# Patient Record
Sex: FEMALE | Race: WHITE
[De-identification: names, ages, dates, MRNs, and addresses within clinical notes are randomized per-mention and may not be internally consistent; named-entity substitution may affect disease eponyms.]

---

## 2019-08-13 ENCOUNTER — HOSPITAL ENCOUNTER (OUTPATIENT)
Age: 59
End: 2019-08-13
Payer: COMMERCIAL

## 2019-08-13 DIAGNOSIS — Z12.4: Primary | ICD-10-CM

## 2019-09-04 ENCOUNTER — HOSPITAL ENCOUNTER (OUTPATIENT)
Age: 59
End: 2019-09-04
Payer: COMMERCIAL

## 2019-09-04 DIAGNOSIS — Z12.31: Primary | ICD-10-CM

## 2019-09-04 DIAGNOSIS — Z13.820: ICD-10-CM

## 2019-09-04 PROCEDURE — 77063 BREAST TOMOSYNTHESIS BI: CPT

## 2019-09-04 PROCEDURE — 77080 DXA BONE DENSITY AXIAL: CPT

## 2019-09-04 PROCEDURE — 77067 SCR MAMMO BI INCL CAD: CPT

## 2019-09-13 ENCOUNTER — HOSPITAL ENCOUNTER (OUTPATIENT)
Age: 59
End: 2019-09-13
Payer: COMMERCIAL

## 2019-09-13 DIAGNOSIS — Z13.21: Primary | ICD-10-CM

## 2019-09-13 LAB
CALCIUM SERPL-MCNC: 9.3 MG/DL (ref 8.5–10.1)
VITAMIN D,25 HYDROXY: 21.9 NG/ML (ref 29.95–100.01)

## 2019-09-13 PROCEDURE — 36415 COLL VENOUS BLD VENIPUNCTURE: CPT

## 2019-09-13 PROCEDURE — 82310 ASSAY OF CALCIUM: CPT

## 2019-09-13 PROCEDURE — 82306 VITAMIN D 25 HYDROXY: CPT

## 2021-02-09 ENCOUNTER — HOSPITAL ENCOUNTER (OUTPATIENT)
Age: 61
End: 2021-02-09
Payer: COMMERCIAL

## 2021-02-09 DIAGNOSIS — Z12.31: Primary | ICD-10-CM

## 2021-02-09 PROCEDURE — 77063 BREAST TOMOSYNTHESIS BI: CPT

## 2021-02-09 PROCEDURE — 77067 SCR MAMMO BI INCL CAD: CPT

## 2022-05-03 ENCOUNTER — HOSPITAL ENCOUNTER (OUTPATIENT)
Dept: HOSPITAL 100 - OPBI | Age: 62
Discharge: HOME | End: 2022-05-03
Payer: COMMERCIAL

## 2022-05-03 DIAGNOSIS — Z80.3: ICD-10-CM

## 2022-05-03 DIAGNOSIS — M85.9: ICD-10-CM

## 2022-05-03 DIAGNOSIS — Z12.31: Primary | ICD-10-CM

## 2022-05-03 PROCEDURE — 77067 SCR MAMMO BI INCL CAD: CPT

## 2022-05-03 PROCEDURE — 77063 BREAST TOMOSYNTHESIS BI: CPT

## 2022-06-02 ENCOUNTER — HOSPITAL ENCOUNTER (OUTPATIENT)
Dept: HOSPITAL 100 - OPBD | Age: 62
Discharge: HOME | End: 2022-06-02
Payer: COMMERCIAL

## 2022-06-02 DIAGNOSIS — Z78.0: ICD-10-CM

## 2022-06-02 DIAGNOSIS — M85.80: Primary | ICD-10-CM

## 2022-06-02 PROCEDURE — 77080 DXA BONE DENSITY AXIAL: CPT

## 2022-06-21 ENCOUNTER — HOSPITAL ENCOUNTER (OUTPATIENT)
Dept: HOSPITAL 100 - LABSPEC | Age: 62
Discharge: HOME | End: 2022-06-21
Payer: COMMERCIAL

## 2022-06-21 DIAGNOSIS — Z12.4: Primary | ICD-10-CM

## 2022-06-21 PROCEDURE — G0145 SCR C/V CYTO,THINLAYER,RESCR: HCPCS

## 2022-06-21 PROCEDURE — 87624 HPV HI-RISK TYP POOLED RSLT: CPT

## 2022-06-21 PROCEDURE — 88175 CYTOPATH C/V AUTO FLUID REDO: CPT

## 2023-06-27 ENCOUNTER — TELEPHONE (OUTPATIENT)
Dept: PRIMARY CARE | Facility: CLINIC | Age: 63
End: 2023-06-27

## 2023-06-27 DIAGNOSIS — M54.31 BILATERAL SCIATICA: Primary | ICD-10-CM

## 2023-06-27 DIAGNOSIS — M54.32 BILATERAL SCIATICA: Primary | ICD-10-CM

## 2023-06-27 RX ORDER — GABAPENTIN 300 MG/1
300 CAPSULE ORAL 3 TIMES DAILY
Qty: 90 CAPSULE | Refills: 5 | Status: SHIPPED | OUTPATIENT
Start: 2023-06-27 | End: 2023-12-01 | Stop reason: ALTCHOICE

## 2023-06-27 RX ORDER — PREDNISONE 20 MG/1
TABLET ORAL
Qty: 18 TABLET | Refills: 0 | Status: SHIPPED | OUTPATIENT
Start: 2023-06-27 | End: 2023-12-01 | Stop reason: ALTCHOICE

## 2023-06-27 RX ORDER — TIZANIDINE 4 MG/1
4 TABLET ORAL EVERY 6 HOURS PRN
Qty: 30 TABLET | Refills: 0 | Status: SHIPPED | OUTPATIENT
Start: 2023-06-27 | End: 2023-12-01 | Stop reason: ALTCHOICE

## 2023-06-27 NOTE — TELEPHONE ENCOUNTER
Patient called the office stating the left sciatica is acting up again and questions if you can call in the meds she took previously for it, but does not want to take the opioid.  Patient also states she is willing to try another medication if you feel it would work better for the sciatica.

## 2023-06-27 NOTE — TELEPHONE ENCOUNTER
Called patient, no answer, left message on voicemail that medications have been E-scribed to pharmacy and she may call office if has further questions.

## 2023-07-26 ENCOUNTER — HOSPITAL ENCOUNTER (OUTPATIENT)
Dept: HOSPITAL 100 - OPBI | Age: 63
Discharge: HOME | End: 2023-07-26
Payer: COMMERCIAL

## 2023-07-26 DIAGNOSIS — Z12.31: Primary | ICD-10-CM

## 2023-07-26 PROCEDURE — 77067 SCR MAMMO BI INCL CAD: CPT

## 2023-07-26 PROCEDURE — 77063 BREAST TOMOSYNTHESIS BI: CPT

## 2023-11-28 ENCOUNTER — HOSPITAL ENCOUNTER (EMERGENCY)
Facility: HOSPITAL | Age: 63
Discharge: HOME | End: 2023-11-28
Payer: COMMERCIAL

## 2023-11-28 ENCOUNTER — APPOINTMENT (OUTPATIENT)
Dept: RADIOLOGY | Facility: HOSPITAL | Age: 63
End: 2023-11-28
Payer: COMMERCIAL

## 2023-11-28 VITALS
HEIGHT: 64 IN | TEMPERATURE: 98.4 F | WEIGHT: 142 LBS | DIASTOLIC BLOOD PRESSURE: 75 MMHG | BODY MASS INDEX: 24.24 KG/M2 | HEART RATE: 83 BPM | SYSTOLIC BLOOD PRESSURE: 177 MMHG | RESPIRATION RATE: 16 BRPM | OXYGEN SATURATION: 95 %

## 2023-11-28 DIAGNOSIS — S82.839A CLOSED FRACTURE OF DISTAL END OF FIBULA, UNSPECIFIED FRACTURE MORPHOLOGY, INITIAL ENCOUNTER: Primary | ICD-10-CM

## 2023-11-28 PROCEDURE — 73610 X-RAY EXAM OF ANKLE: CPT | Mod: LT,FY,FR

## 2023-11-28 PROCEDURE — 73610 X-RAY EXAM OF ANKLE: CPT | Mod: LEFT SIDE | Performed by: RADIOLOGY

## 2023-11-28 PROCEDURE — 99283 EMERGENCY DEPT VISIT LOW MDM: CPT

## 2023-11-28 ASSESSMENT — PAIN - FUNCTIONAL ASSESSMENT: PAIN_FUNCTIONAL_ASSESSMENT: 0-10

## 2023-11-28 ASSESSMENT — COLUMBIA-SUICIDE SEVERITY RATING SCALE - C-SSRS
2. HAVE YOU ACTUALLY HAD ANY THOUGHTS OF KILLING YOURSELF?: NO
6. HAVE YOU EVER DONE ANYTHING, STARTED TO DO ANYTHING, OR PREPARED TO DO ANYTHING TO END YOUR LIFE?: NO
1. IN THE PAST MONTH, HAVE YOU WISHED YOU WERE DEAD OR WISHED YOU COULD GO TO SLEEP AND NOT WAKE UP?: NO

## 2023-11-28 ASSESSMENT — PAIN SCALES - GENERAL: PAINLEVEL_OUTOF10: 4

## 2023-11-28 ASSESSMENT — PAIN DESCRIPTION - LOCATION: LOCATION: ANKLE

## 2023-11-28 ASSESSMENT — PAIN DESCRIPTION - ORIENTATION: ORIENTATION: LEFT

## 2023-11-28 ASSESSMENT — PAIN DESCRIPTION - PAIN TYPE: TYPE: ACUTE PAIN

## 2023-11-29 NOTE — DISCHARGE INSTRUCTIONS
Crutches and post-op shoe until further directed.  Tylenol or motrin for pain.  Ice, elevate and rest

## 2023-11-29 NOTE — ED PROVIDER NOTES
"HPI   Chief Complaint   Patient presents with    Ankle Pain     Let ankle/lower leg pain \"I slipped on the ice\" around 1630 today. Denies hitting head or other injury       Patient complains of left ankle pain prior to arrival.  States she slipped on the ice.  Denies other injury no head injury or loss of consciousness.  States she had ambulate about a mile back to where she could get home.  She had pain with ambulation and movement.  Pain is relieved with rest.  She noticed small amount of swelling to the lateral left ankle.  No self treatment came right in.      History provided by:  Patient                      No data recorded                Patient History   History reviewed. No pertinent past medical history.  Past Surgical History:   Procedure Laterality Date    OTHER SURGICAL HISTORY  2020    Dilation and curettage    OTHER SURGICAL HISTORY  2020    Colonoscopy    OTHER SURGICAL HISTORY  2020     section     No family history on file.  Social History     Tobacco Use    Smoking status: Former     Types: Cigarettes    Smokeless tobacco: Never   Substance Use Topics    Alcohol use: Yes     Comment: occasionally    Drug use: Never       Physical Exam   ED Triage Vitals [23 1816]   Temp Heart Rate Resp BP   36.9 °C (98.4 °F) 83 16 177/75      SpO2 Temp Source Heart Rate Source Patient Position   95 % Temporal -- --      BP Location FiO2 (%)     -- --       Physical Exam  Vitals and nursing note reviewed.   Constitutional:       General: She is not in acute distress.     Appearance: Normal appearance. She is well-developed, well-groomed and normal weight. She is not ill-appearing or toxic-appearing.   HENT:      Head: Normocephalic.      Right Ear: External ear normal.      Left Ear: External ear normal.      Nose: Nose normal.      Mouth/Throat:      Mouth: Mucous membranes are moist.   Eyes:      General: No scleral icterus.     Conjunctiva/sclera: Conjunctivae normal. "   Musculoskeletal:         General: Swelling and tenderness present. No deformity.      Left ankle: Swelling present. No deformity. Tenderness present over the lateral malleolus. No base of 5th metatarsal or proximal fibula tenderness. Decreased range of motion. Normal pulse.      Left Achilles Tendon: Normal.      Left foot: Normal.   Skin:     General: Skin is warm.      Capillary Refill: Capillary refill takes less than 2 seconds.      Findings: No bruising or rash.   Neurological:      General: No focal deficit present.      Mental Status: She is alert and oriented to person, place, and time.      GCS: GCS eye subscore is 4. GCS verbal subscore is 5. GCS motor subscore is 6.      Cranial Nerves: No cranial nerve deficit or facial asymmetry.      Sensory: No sensory deficit.      Motor: No weakness.   Psychiatric:         Mood and Affect: Mood normal.         Behavior: Behavior normal. Behavior is cooperative.         Thought Content: Thought content normal.         Judgment: Judgment normal.         ED Course & MDM   Diagnoses as of 11/28/23 1936   Closed fracture of distal end of fibula, unspecified fracture morphology, initial encounter       Medical Decision Making  Patient complains of left ankle pain prior to arrival.  States she slipped on the ice.  Denies other injury no head injury or loss of consciousness.  States she had ambulate about a mile back to where she could get home.  She had pain with ambulation and movement.  Pain is relieved with rest.  She noticed small amount of swelling to the lateral left ankle.  No self treatment came right in.    Ddx: Fracture, contusion, strain, sprain, other    Series obtained read by myself showing a lateral malleoli are fracture.  Questionable posterior malleoli are fracture.  Patient placed in walking boot and crutches.  Instructed to follow-up with either foot and ankle specialist or general orthopedics.  Both options given to patient.  Patient is discharged  home in improved and stable condition.  9 pain medication while in the emergency department.    Amount and/or Complexity of Data Reviewed  Radiology: ordered and independent interpretation performed. Decision-making details documented in ED Course.     Details: By myself showing lateral malleoli are fracture.  Possible posterior malleoli are fracture.    Risk  OTC drugs.  Diagnosis or treatment significantly limited by social determinants of health.        Procedure  Procedures     Veena Luis PA-C  11/28/23 1936

## 2023-12-01 ENCOUNTER — OFFICE VISIT (OUTPATIENT)
Dept: ORTHOPEDIC SURGERY | Facility: CLINIC | Age: 63
End: 2023-12-01
Payer: COMMERCIAL

## 2023-12-01 DIAGNOSIS — S82.892A CLOSED LEFT ANKLE FRACTURE, INITIAL ENCOUNTER: Primary | ICD-10-CM

## 2023-12-01 PROCEDURE — 1036F TOBACCO NON-USER: CPT | Performed by: NURSE PRACTITIONER

## 2023-12-01 PROCEDURE — 99204 OFFICE O/P NEW MOD 45 MIN: CPT | Performed by: NURSE PRACTITIONER

## 2023-12-01 RX ORDER — ACETAMINOPHEN 500 MG
TABLET ORAL DAILY
COMMUNITY

## 2023-12-01 ASSESSMENT — ENCOUNTER SYMPTOMS
NEUROLOGICAL NEGATIVE: 1
RESPIRATORY NEGATIVE: 1
CARDIOVASCULAR NEGATIVE: 1
ENDOCRINE NEGATIVE: 1
PSYCHIATRIC NEGATIVE: 1
CONSTITUTIONAL NEGATIVE: 1
HEMATOLOGIC/LYMPHATIC NEGATIVE: 1
MUSCULOSKELETAL NEGATIVE: 1

## 2023-12-01 ASSESSMENT — PAIN SCALES - GENERAL: PAINLEVEL_OUTOF10: 5 - MODERATE PAIN

## 2023-12-01 ASSESSMENT — PAIN DESCRIPTION - DESCRIPTORS: DESCRIPTORS: ACHING;DULL

## 2023-12-01 ASSESSMENT — PAIN - FUNCTIONAL ASSESSMENT: PAIN_FUNCTIONAL_ASSESSMENT: 0-10

## 2023-12-01 NOTE — PROGRESS NOTES
Subjective    Patient ID: Natalie Fung is a 63 y.o. female.    Chief Complaint: Pain of the Left Ankle (Patient had a fall on 11/28/2023 and she went to the ER they did Xray's at that time. )       TANYA  Is a pleasant 63-year-old female presenting today for evaluation of left ankle injury and fracture. Slid on ice, twisted outward and landed. + swelling and pain after, diffiiculty walking. Took tylenol once yesterday with some relief.   No ice or elevation. Boot helps with immobilization. Minimal wt bearing using crutches.     Review of Systems   Constitutional: Negative.    HENT: Negative.     Respiratory: Negative.     Cardiovascular: Negative.    Endocrine: Negative.    Musculoskeletal: Negative.    Skin: Negative.    Neurological: Negative.    Hematological: Negative.    Psychiatric/Behavioral: Negative.      Foot/Ankle Musculoskeletal Exam  Gait    Assistive device: crutches  Gait additional comments: Minimal touch toe weight, in tall walking boot    Inspection    Left      Edema: moderate        Ecchymosis: medium        Deformity: none        Alignment: normal      Palpation    Left       Tenderness: present          ATFL: moderate          Distal fibula: mild      Range of Motion    Left      Active Dorsiflexion: 10      Active Plantar Flexion: 15      Active Inversion: 5      Active Eversion: 5    Neurovascular    Left      Pulses - DP: normal      Dorsalis pedis: 2+      Capillary refill: brisk and well-perfused    Special Tests    Left      Anterior drawer test: negative      Membreno's test: negative      Special tests additional comments: No pain over metatarsals, full ROM of distal joints with no sx aggravation, distal motor and sensory intact    Objective   Left Ankle Exam     Tests   Anterior drawer: negative          Image Results:  XR ankle left 3+ views  Narrative: STUDY:  Ankle Radiographs;    11/28/2023 5:26 PM.  INDICATION:  Pain.  COMPARISON:  None Available.  ACCESSION  NUMBER(S):  [id number]  ORDERING CLINICIAN:  DAVIDE MENA  TECHNIQUE:  Three view(s) of the left ankle.  FINDINGS:    There is an oblique but essentially nondisplaced fracture in the  distal fibula just above the lateral malleolus.  There also appears to  be a nondisplaced fracture in the posterior tibial articular surface..   The medial malleolus is intact and there is no disruption of the  ankle mortise..  No soft tissue abnormality is seen.  Impression: There is an oblique but nondisplaced fracture in the distal fibula  above the lateral malleolus and a nondisplaced vertical fracture  through the posterior tibia at the articular surface with the talus..  Signed by Tony Leiva MD    Reviewed imaging with patient during today's visit of both fractures.  Collaborated case with Dr. Stephenson prior to visit on date of service.    Assessment/Plan   Encounter Diagnoses:  Problem List Items Addressed This Visit             ICD-10-CM    Closed left ankle fracture, initial encounter - Primary S82.892A     Sx control with OTC NSAIDs per package directions, take with food, Tylenol prn.   Instructed on elevation and ice.  Patient was instructed to wear the boot at all times with exception to remove daily and as needed for hygiene and/or icing.  Patient was advised to continue with nonweightbearing status and anticipate 4 to 6 weeks of this.  A prescription for a knee walker was provided.  Activity restriction recommended:  Follow up here 2-3 weeks with repeat imaging, sooner for changes or concerns.    We reviewed calcium and vitamin D supplementation daily and at least 2 servings of dairy daily for bone health.  At time of DC, patient states she is not going to a podiatry appointment as ordered by the ED for the same issue.  Patient is aware that either myself or podiatry can manage this case.  I did speak with Dr. Terrazas who was anticipating the same plan of care.  At this time, patient will continue to follow here.  She  is aware I can easily refer should there be any concerns in bony alignment for evaluation.  She is in agreement with this plan of care.  This note was generated using Dragon software.  It may contain errors in wording, punctuation or spelling.           Relevant Orders    Follow Up In Orthopaedic Surgery    Knee Walker    XR ankle left 3+ views

## 2023-12-01 NOTE — ASSESSMENT & PLAN NOTE
Sx control with OTC NSAIDs per package directions, take with food, Tylenol prn.   Instructed on elevation and ice.  Patient was instructed to wear the boot at all times with exception to remove daily and as needed for hygiene and/or icing.  Patient was advised to continue with nonweightbearing status and anticipate 4 to 6 weeks of this.  A prescription for a knee walker was provided.  Activity restriction recommended:  Follow up here 2-3 weeks with repeat imaging, sooner for changes or concerns.    We reviewed calcium and vitamin D supplementation daily and at least 2 servings of dairy daily for bone health.  At time of DC, patient states she is not going to a podiatry appointment as ordered by the ED for the same issue.  Patient is aware that either myself or podiatry can manage this case.  I did speak with Dr. Terrazas who was anticipating the same plan of care.  At this time, patient will continue to follow here.  She is aware I can easily refer should there be any concerns in bony alignment for evaluation.  She is in agreement with this plan of care.  This note was generated using Dragon software.  It may contain errors in wording, punctuation or spelling.

## 2023-12-14 PROBLEM — M54.16 LUMBAR RADICULOPATHY, ACUTE: Status: ACTIVE | Noted: 2023-12-14

## 2023-12-15 ENCOUNTER — ANCILLARY PROCEDURE (OUTPATIENT)
Dept: RADIOLOGY | Facility: CLINIC | Age: 63
End: 2023-12-15
Payer: COMMERCIAL

## 2023-12-15 ENCOUNTER — OFFICE VISIT (OUTPATIENT)
Dept: ORTHOPEDIC SURGERY | Facility: CLINIC | Age: 63
End: 2023-12-15
Payer: COMMERCIAL

## 2023-12-15 DIAGNOSIS — S82.892A CLOSED LEFT ANKLE FRACTURE, INITIAL ENCOUNTER: Primary | ICD-10-CM

## 2023-12-15 DIAGNOSIS — S82.892A CLOSED LEFT ANKLE FRACTURE, INITIAL ENCOUNTER: ICD-10-CM

## 2023-12-15 PROCEDURE — 73610 X-RAY EXAM OF ANKLE: CPT | Mod: LEFT SIDE | Performed by: RADIOLOGY

## 2023-12-15 PROCEDURE — 73610 X-RAY EXAM OF ANKLE: CPT | Mod: LT,FY

## 2023-12-15 PROCEDURE — 1036F TOBACCO NON-USER: CPT | Performed by: NURSE PRACTITIONER

## 2023-12-15 PROCEDURE — 99213 OFFICE O/P EST LOW 20 MIN: CPT | Performed by: NURSE PRACTITIONER

## 2023-12-15 ASSESSMENT — PAIN SCALES - GENERAL: PAINLEVEL_OUTOF10: 2

## 2023-12-15 ASSESSMENT — ENCOUNTER SYMPTOMS
RESPIRATORY NEGATIVE: 1
NEUROLOGICAL NEGATIVE: 1
CONSTITUTIONAL NEGATIVE: 1
ARTHRALGIAS: 1
ENDOCRINE NEGATIVE: 1
CARDIOVASCULAR NEGATIVE: 1
PSYCHIATRIC NEGATIVE: 1
HEMATOLOGIC/LYMPHATIC NEGATIVE: 1

## 2023-12-15 ASSESSMENT — PAIN - FUNCTIONAL ASSESSMENT: PAIN_FUNCTIONAL_ASSESSMENT: 0-10

## 2023-12-15 ASSESSMENT — PAIN DESCRIPTION - DESCRIPTORS: DESCRIPTORS: DULL

## 2023-12-15 NOTE — ASSESSMENT & PLAN NOTE
Sx control with OTC NSAIDs per package directions, take with food, Tylenol prn, elevation and ice.  Patient was instructed to wear the boot at all times with exception to remove daily and as needed for hygiene and/or icing. Patient was advised to continue with nonweightbearing status and anticipate 2 to 4 weeks of this.  Continue to use crutches or knee walker for mobility.  Follow up here 2-3 weeks with repeat imaging, sooner for changes or concerns.    This note was generated using Dragon software.  It may contain errors in wording, punctuation or spelling.

## 2023-12-15 NOTE — PROGRESS NOTES
Subjective    Patient ID: Natalie Fung is a 63 y.o. female.    Chief Complaint: Fracture and Follow-up of the Left Ankle       Left Ankle     Natalie is a pleasant 63-year-old female presenting today for FUV left ankle injury and fracture. Slid on ice, twisted outward and landed. + swelling and pain after, diffiiculty walking.   Rare use of Tylenol or ibuprofen with good symptom control  Occasional ice. Boot helps with immobilization. Minimal touch toe wt bearing using crutches or knee walker.    Review of Systems   Constitutional: Negative.    HENT: Negative.     Respiratory: Negative.     Cardiovascular: Negative.    Endocrine: Negative.    Musculoskeletal:  Positive for arthralgias.   Skin: Negative.    Neurological: Negative.    Hematological: Negative.    Psychiatric/Behavioral: Negative.        Foot/Ankle Musculoskeletal Exam  Gait    Assistive device: crutches  Gait additional comments: Mostly non weight bearing, in walking boot    Inspection    Left      Edema: mild        Ecchymosis: medium        Deformity: none        Alignment: normal      Palpation    Left       Tenderness: present        Tenderness comment: distal posterior tibia        ATFL: mild          Distal fibula: mild      Range of Motion    Left      Active Dorsiflexion: 10      Active Plantar Flexion: 15      Active Inversion: 5      Active Eversion: 5    Neurovascular    Left      Pulses - DP: normal      Dorsalis pedis: 2+      Capillary refill: brisk and well-perfused    Special Tests    Left      Anterior drawer test: negative      Membreno's test: negative      Special tests additional comments: No pain over metatarsals, full ROM of distal joints with no sx aggravation, distal motor and sensory intact    Objective   Left Ankle Exam     Tests   Anterior drawer: negative          Image Results:  I independent review of ankle images compared to prior imaging during today's visit.  There does appear to be some sclerosis and callus formation  of the distal fibula, alignment unchanged.  The posterior tibia fracture site also appears the same with some sclerosis and callus formation, no change in alignment.  Ankle mortise is intact.  Await radiology report.      Assessment/Plan   Encounter Diagnoses:  Problem List Items Addressed This Visit             ICD-10-CM    Closed left ankle fracture, initial encounter - Primary S82.892A    Relevant Orders    Follow Up In Orthopaedic Surgery    XR ankle left 3+ views     Problem List Items Addressed This Visit             ICD-10-CM    Closed left ankle fracture, initial encounter - Primary S82.892A     Sx control with OTC NSAIDs per package directions, take with food, Tylenol prn, elevation and ice.  Patient was instructed to wear the boot at all times with exception to remove daily and as needed for hygiene and/or icing. Patient was advised to continue with nonweightbearing status and anticipate 2 to 4 weeks of this.  Continue to use crutches or knee walker for mobility.  Follow up here 2-3 weeks with repeat imaging, sooner for changes or concerns.    This note was generated using Dragon software.  It may contain errors in wording, punctuation or spelling.            Relevant Orders    Follow Up In Orthopaedic Surgery    XR ankle left 3+ views

## 2024-01-02 ENCOUNTER — ANCILLARY PROCEDURE (OUTPATIENT)
Dept: RADIOLOGY | Facility: CLINIC | Age: 64
End: 2024-01-02
Payer: COMMERCIAL

## 2024-01-02 ENCOUNTER — OFFICE VISIT (OUTPATIENT)
Dept: ORTHOPEDIC SURGERY | Facility: CLINIC | Age: 64
End: 2024-01-02
Payer: COMMERCIAL

## 2024-01-02 DIAGNOSIS — S82.892A CLOSED LEFT ANKLE FRACTURE, INITIAL ENCOUNTER: Primary | ICD-10-CM

## 2024-01-02 DIAGNOSIS — S82.892A CLOSED LEFT ANKLE FRACTURE, INITIAL ENCOUNTER: ICD-10-CM

## 2024-01-02 PROCEDURE — 99213 OFFICE O/P EST LOW 20 MIN: CPT | Performed by: NURSE PRACTITIONER

## 2024-01-02 PROCEDURE — 73610 X-RAY EXAM OF ANKLE: CPT | Mod: LT

## 2024-01-02 PROCEDURE — 73610 X-RAY EXAM OF ANKLE: CPT | Mod: LEFT SIDE | Performed by: RADIOLOGY

## 2024-01-02 PROCEDURE — L1902 AFO ANKLE GAUNTLET PRE OTS: HCPCS | Performed by: NURSE PRACTITIONER

## 2024-01-02 PROCEDURE — 1036F TOBACCO NON-USER: CPT | Performed by: NURSE PRACTITIONER

## 2024-01-02 ASSESSMENT — ENCOUNTER SYMPTOMS
CARDIOVASCULAR NEGATIVE: 1
RESPIRATORY NEGATIVE: 1
PSYCHIATRIC NEGATIVE: 1
NEUROLOGICAL NEGATIVE: 1
HEMATOLOGIC/LYMPHATIC NEGATIVE: 1
ENDOCRINE NEGATIVE: 1
CONSTITUTIONAL NEGATIVE: 1
ARTHRALGIAS: 1

## 2024-01-02 ASSESSMENT — PAIN SCALES - GENERAL: PAINLEVEL_OUTOF10: 1

## 2024-01-02 ASSESSMENT — PAIN DESCRIPTION - DESCRIPTORS: DESCRIPTORS: DULL

## 2024-01-02 ASSESSMENT — PAIN - FUNCTIONAL ASSESSMENT: PAIN_FUNCTIONAL_ASSESSMENT: 0-10

## 2024-01-02 NOTE — PROGRESS NOTES
Subjective    Patient ID: Natalie Fung is a 63 y.o. female.    Chief Complaint: Pain of the Left Ankle (LEFT ANKLE FUV/CLOSED FRACTURE 12-15-23/X-RAYS 1-2-24/PAIN RATE 1)       Left Ankle     Natalie is a pleasant 63-year-old female presenting today for FUV left ankle injury and fracture from 12/15/23. Slid on ice, twisted outward and landed. + swelling and pain after, diffiiculty walking.   Rare use of Tylenol or ibuprofen with good symptom control.  Occasional ice. Boot helps with immobilization. Minimal touch toe wt bearing using crutches or knee walker.    Review of Systems   Constitutional: Negative.    HENT: Negative.     Respiratory: Negative.     Cardiovascular: Negative.    Endocrine: Negative.    Musculoskeletal:  Positive for arthralgias.   Skin: Negative.    Neurological: Negative.    Hematological: Negative.    Psychiatric/Behavioral: Negative.        Foot/Ankle Musculoskeletal Exam  Gait    Assistive device: crutches  Gait additional comments: Mostly non weight bearing, in walking boot    Inspection    Left      Edema: mild        Ecchymosis: small        Deformity: none        Alignment: normal      Palpation    Left       Tenderness: present        Tenderness comment: distal posterior tibia        ATFL: mild          Distal fibula: mild      Range of Motion    Left      Active Dorsiflexion: 10      Active Plantar Flexion: 20      Active Inversion: 10      Active Eversion: 10    Neurovascular    Left      Pulses - DP: normal      Dorsalis pedis: 2+      Capillary refill: brisk and well-perfused    Special Tests    Left      Anterior drawer test: negative      Membreno's test: negative      Special tests additional comments: No pain over metatarsals, full ROM of distal joints with no sx aggravation, distal motor and sensory intact    Objective   Left Ankle Exam     Tests   Anterior drawer: negative          Image Results:  Independent review of ankle images compared to prior imaging during today's  visit.  There is increased callus formation of the distal fibula, alignment unchanged.  The posterior tibia fracture site with good bridging callus formation, no change in alignment.  Ankle mortise is intact.  Await radiology report.    === 01/02/24 ===    XR ANKLE 3+ VIEWS LEFT    - Impression -  1. Healing nondisplaced Faulkner B distal fibular fracture with  surrounding reactive soft tissue swelling.    MACRO:  None.    Signed by: Sabra Harris 1/3/2024 5:55 PM  Dictation workstation:   [id number]     Assessment/Plan   Encounter Diagnoses:  Problem List Items Addressed This Visit             ICD-10-CM    Closed left ankle fracture, initial encounter - Primary S82.892A     Sx control with OTC NSAIDs per package directions, take with food, Tylenol prn, elevation and ice.  Patient was instructed to wear the boot at all times with activity, may remove daily and as needed for hygiene and/or icing. OK to remove with rest and sleep as tolerated. May attempt ankle speed brace for support with rest.  Continue to use crutches or knee walker for mobility.  Of gradual increase of weightbearing activity.  Patient was recommended she may put approximately 25% weightbearing with crutch use x 1 week, progressed to 50% weightbearing with crutch use, 75% x 1 week.  Patient will be referred for formal PT for range of motion and early strengthening.  Plan will be to follow-up here in approximately 2 to 3 weeks with repeat imaging to verify clearance for full weightbearing status.  Patient in agreement with plan of care.  This note was generated using Dragon software.  It may contain errors in wording, punctuation or spelling.         Relevant Orders    Follow Up In Orthopaedic Surgery    XR ankle left 3+ views    Referral to Physical Therapy

## 2024-01-03 ENCOUNTER — APPOINTMENT (OUTPATIENT)
Dept: ORTHOPEDIC SURGERY | Facility: CLINIC | Age: 64
End: 2024-01-03
Payer: COMMERCIAL

## 2024-01-10 NOTE — ASSESSMENT & PLAN NOTE
Sx control with OTC NSAIDs per package directions, take with food, Tylenol prn, elevation and ice.  Patient was instructed to wear the boot at all times with activity, may remove daily and as needed for hygiene and/or icing. OK to remove with rest and sleep as tolerated. May attempt ankle speed brace for support with rest.  Continue to use crutches or knee walker for mobility.  Of gradual increase of weightbearing activity.  Patient was recommended she may put approximately 25% weightbearing with crutch use x 1 week, progressed to 50% weightbearing with crutch use, 75% x 1 week.  Patient will be referred for formal PT for range of motion and early strengthening.  Plan will be to follow-up here in approximately 2 to 3 weeks with repeat imaging to verify clearance for full weightbearing status.  Patient in agreement with plan of care.  This note was generated using Dragon software.  It may contain errors in wording, punctuation or spelling.

## 2024-01-15 ENCOUNTER — OFFICE VISIT (OUTPATIENT)
Dept: ORTHOPEDIC SURGERY | Facility: CLINIC | Age: 64
End: 2024-01-15
Payer: COMMERCIAL

## 2024-01-15 ENCOUNTER — ANCILLARY PROCEDURE (OUTPATIENT)
Dept: RADIOLOGY | Facility: CLINIC | Age: 64
End: 2024-01-15
Payer: COMMERCIAL

## 2024-01-15 DIAGNOSIS — S82.892A CLOSED LEFT ANKLE FRACTURE, INITIAL ENCOUNTER: ICD-10-CM

## 2024-01-15 PROCEDURE — E0100 CANE ADJUST/FIXED WITH TIP: HCPCS | Performed by: NURSE PRACTITIONER

## 2024-01-15 PROCEDURE — 73610 X-RAY EXAM OF ANKLE: CPT | Mod: LEFT SIDE | Performed by: RADIOLOGY

## 2024-01-15 PROCEDURE — 1036F TOBACCO NON-USER: CPT | Performed by: NURSE PRACTITIONER

## 2024-01-15 PROCEDURE — 99213 OFFICE O/P EST LOW 20 MIN: CPT | Performed by: NURSE PRACTITIONER

## 2024-01-15 PROCEDURE — 73610 X-RAY EXAM OF ANKLE: CPT | Mod: LT

## 2024-01-15 RX ORDER — DICLOFENAC SODIUM 10 MG/G
4 GEL TOPICAL 4 TIMES DAILY PRN
Qty: 100 G | Refills: 1 | Status: SHIPPED | OUTPATIENT
Start: 2024-01-15

## 2024-01-15 ASSESSMENT — ENCOUNTER SYMPTOMS
CONSTITUTIONAL NEGATIVE: 1
ARTHRALGIAS: 1
PSYCHIATRIC NEGATIVE: 1
HEMATOLOGIC/LYMPHATIC NEGATIVE: 1
CARDIOVASCULAR NEGATIVE: 1
NEUROLOGICAL NEGATIVE: 1
ENDOCRINE NEGATIVE: 1
RESPIRATORY NEGATIVE: 1

## 2024-01-15 ASSESSMENT — PAIN - FUNCTIONAL ASSESSMENT: PAIN_FUNCTIONAL_ASSESSMENT: NO/DENIES PAIN

## 2024-01-15 NOTE — PROGRESS NOTES
Subjective    Patient ID: Natalie Fung is a 63 y.o. female.    Chief Complaint: Fracture of the Left Ankle (Patient his here for FUV for fracture of left ankle that occurred on 11/28/2023)     Left Ankle       Natalie is a pleasant 63-year-old female presenting today for FUV left ankle injury and fracture from 12/15/23. Slid on ice, twisted outward and landed. + swelling and pain after, diffiiculty walking. Occasional ice. Boot helps with immobilization.  Progressed to weightbearing to 75% to 100% in the boot.  She does use crutches with work, light activity at home as tolerated well.  No pain or increased swelling noted.  No pain, crutches minimal  No sx aggravation with walking in boot, no meds for pain cotrol needed  HEP 1-2 times daily    Review of Systems   Constitutional: Negative.    HENT: Negative.     Respiratory: Negative.     Cardiovascular: Negative.    Endocrine: Negative.    Musculoskeletal:  Positive for arthralgias.   Skin: Negative.    Neurological: Negative.    Hematological: Negative.    Psychiatric/Behavioral: Negative.        Foot/Ankle Musculoskeletal Exam  Gait    Limp: left    Assistive device: crutches  Gait additional comments:  mostly weightbearing and walking boot, testing at length positive light crutch use    Inspection    Left      Edema: mild        Edema comment: Improved      Ecchymosis: none        Deformity: none        Alignment: normal        Previous ankle incision: no previous incision    Palpation    Left       Tenderness: none        Tenderness comment: none    Range of Motion    Left      Active Dorsiflexion: 10      Active Plantar Flexion: 40      Active Inversion: 20      Active Eversion: 20    Neurovascular    Left      Pulses - DP: normal      Dorsalis pedis: 2+      Capillary refill: brisk and well-perfused    Special Tests    Left      Anterior drawer test: negative      Talar tilt stress test: negative        Membreno's test: negative      Special tests additional  comments: Full ROM of distal joints with no sx aggravation, distal motor and sensory intact    Objective   Left Ankle Exam     Tests   Anterior drawer: negative        Image Results:  Independent review of ankle images compared to prior imaging during today's visit.  There is increased callus formation of the distal fibula, alignment unchanged.  The posterior tibia fracture site with good callus formation, no change in alignment.  Ankle mortise is intact.  Await radiology report.    === 01/02/24 ===    XR ANKLE 3+ VIEWS LEFT    - Impression -  1. Healing nondisplaced Faulkner B distal fibular fracture with  surrounding reactive soft tissue swelling.    MACRO:  None.    Signed by: Sabra Harris 1/3/2024 5:55 PM  Dictation workstation:   [id number]     Assessment/Plan   Encounter Diagnoses:  Problem List Items Addressed This Visit             ICD-10-CM    Closed left ankle fracture, initial encounter S82.892A    Relevant Orders    XR ankle left 3+ views     Problem List Items Addressed This Visit             ICD-10-CM    Closed left ankle fracture, initial encounter S82.892A     Sx control with OTC NSAIDs per package directions, take with food, Tylenol prn, elevation and ice.  Patient was instructed to wear the boot with heavier activity. OK to remove with rest and sleep as tolerated. May attempt ankle speed brace for support with light activity and transition into FT wear over 1 week as tolerated.  May discontinue use of crutches or knee walker at this time.   Keep therapy appointment as scheduled, okay to advance to range of motion and weightbearing as tolerated, PT may also attempt to wean out of the brace as patient tolerates.  Plan will be to follow-up here in approximately 4 weeks with repeat imaging.  Patient in agreement with plan of care.  This note was generated using Dragon software.  It may contain errors in wording, punctuation or spelling.         Relevant Medications    diclofenac sodium (Voltaren) 1 %  gel gel    Other Relevant Orders    XR ankle left 3+ views    Follow Up In Orthopaedic Surgery    XR ankle left 3+ views

## 2024-01-15 NOTE — ASSESSMENT & PLAN NOTE
Sx control with OTC NSAIDs per package directions, take with food, Tylenol prn, elevation and ice.  Patient was instructed to wear the boot with heavier activity. OK to remove with rest and sleep as tolerated. May attempt ankle speed brace for support with light activity and transition into FT wear over 1 week as tolerated.  May discontinue use of crutches or knee walker at this time.   Keep therapy appointment as scheduled, okay to advance to range of motion and weightbearing as tolerated, PT may also attempt to wean out of the brace as patient tolerates.  Plan will be to follow-up here in approximately 4 weeks with repeat imaging.  Patient in agreement with plan of care.  This note was generated using Dragon software.  It may contain errors in wording, punctuation or spelling.

## 2024-01-25 ENCOUNTER — EVALUATION (OUTPATIENT)
Dept: PHYSICAL THERAPY | Facility: CLINIC | Age: 64
End: 2024-01-25
Payer: COMMERCIAL

## 2024-01-25 DIAGNOSIS — S82.892D CLOSED FRACTURE OF LEFT ANKLE, WITH ROUTINE HEALING, SUBSEQUENT ENCOUNTER: ICD-10-CM

## 2024-01-25 DIAGNOSIS — S82.892D: Primary | ICD-10-CM

## 2024-01-25 PROCEDURE — 97161 PT EVAL LOW COMPLEX 20 MIN: CPT | Mod: GP | Performed by: PHYSICAL THERAPIST

## 2024-01-25 PROCEDURE — 97110 THERAPEUTIC EXERCISES: CPT | Mod: GP | Performed by: PHYSICAL THERAPIST

## 2024-01-25 ASSESSMENT — PATIENT HEALTH QUESTIONNAIRE - PHQ9
SUM OF ALL RESPONSES TO PHQ9 QUESTIONS 1 AND 2: 0
2. FEELING DOWN, DEPRESSED OR HOPELESS: NOT AT ALL
1. LITTLE INTEREST OR PLEASURE IN DOING THINGS: NOT AT ALL

## 2024-01-25 ASSESSMENT — PAIN SCALES - GENERAL: PAINLEVEL_OUTOF10: 1

## 2024-01-25 ASSESSMENT — PAIN - FUNCTIONAL ASSESSMENT: PAIN_FUNCTIONAL_ASSESSMENT: 0-10

## 2024-01-25 ASSESSMENT — ENCOUNTER SYMPTOMS
DEPRESSION: 0
LOSS OF SENSATION IN FEET: 0
OCCASIONAL FEELINGS OF UNSTEADINESS: 0

## 2024-01-25 NOTE — PROGRESS NOTES
Physical Therapy Evaluation and Treatment      Patient Name: Natalie Fung  MRN: [id number]  Today's Date: 1/25/2024  Time Calculation  Start Time: 1555  Stop Time: 1630  Time Calculation (min): 35 min      Visit # 1/7    Assessment:  PT Assessment Results: Decreased strength, Decreased range of motion, Impaired balance, Decreased mobility, Pain  Rehab Prognosis: Good  Barriers to Participation:  (None)    The pt presents with Medical Dx of L ankle fx.   Pt presents with the following deficits: increased pain, decreased strength, ROM, flexibility, balance, gait and functional mobility.  Pt would benefit from PT services in order to improve on these deficits and maximize strength and ability for functional activity/mobility.        Plan:  Treatment/Interventions: Cryotherapy, Education/ Instruction, Hot pack, Gait training, Manual therapy, Neuromuscular re-education, Therapeutic exercises  PT Plan: Skilled PT  PT Frequency:  (1x/wk for 6 weeks or 7 sessions)  Rehab Potential: Good  Plan of Care Agreement: Patient (Patient Goal:  improve strength, ROM and get back to normal activity)    Current Problem:   Problem List Items Addressed This Visit             ICD-10-CM       Other    Left malleolar fracture, closed, with routine healing, subsequent encounter - Primary S82.892D    Relevant Orders    Follow Up In Physical Therapy       Subjective   The pt slipped on black ice in 11/2023 sustaining L ankle fx.  Pt was in a cam walker boot for 7 weeks and is now able to WBAT on the L leg.  Pt reports that the ankle pain is worse with standing or walking too long and improves with ice or rest.      General  Reason for Referral: L ankle injury  Referred By: Daya Dixon CNP  General Comment: Visit # 1/7    Precautions:  Precautions  Precautions Comment: Lower Fall Risk.   PMH:  No significant PMH    Pain:  Pain Assessment  Pain Assessment: 0-10  Pain Score: 1 (L ankle pain range  0-5/10)  Pain Location: Ankle  Pain  Orientation: Left    Home Living:   Lives alone.  No concerns with home set up.      Prior Level of Function:   Pt was I with all ADL's and IADL's prior.  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         Objective   Observation:  Mild-Moderate edema L ankle/lower leg.    Gait:  Gait is mildly antalgic with use of cane in ankle brace WBAT.    Palpation:  Mild tenderness lateral ankle.      Sensation:  Intact to light touch.      Strength:             R-   WNL Throughout        L-   DF   4-/5    PF   4-/5   Inv   4-/5   Ev   4-/5    Ankle PROM:              R-   WNL Throughout        L-   DF 9  deg   Inv 30  deg   Ev 18  deg    Flexibility:   Mild Gastroc tightness.    Special Tests:       N/A    Outcome Measures:  Other Measures  Lower Extremity Funtional Score (LEFS): 49     Treatments:  Ther Ex:  10 min  1 unit  DF Strap Stretch  20 sec hold x 3  Resistive DR/Inv/Ev   x 20 reps ea   Blue Band    HEP instruction with handout given.      OP EDUCATION:  PT edu pt regarding PT POC/course of treatment and HEP.  Pt was given exercise handout as a reference.  Pt verbalized good understanding.  Access Code: [email or web address]      Goals:  Active       PT Problem       PT Goal 1       Start:  24    Expected End:  24       LTG's:  1) Improve L ankle strength from 4-/5  to >= 5-/5 throughout in order to facilitate safe gait and mobility.      4-6 weeks      2) Improve L ankle PROM/AROM from  9 deg DF to >= 13 deg DF  in order to facilitate safe gait and stair negotiation.      4-6 weeks      3) Improve L ankle pain from  5/10 to <= 0-2/10 with activity in order to improve QOL.  4-6 weeks      4) Improve LEFS score by >= 5 points in order to improve QOL.   4-6 weeks      5) Pt will be able to walk longer distances, negotiate stairs, and return to exercising, or work around the home on on the job without significant limitation.      4-6 weeks      ST) Pt/caregiver will be I and consistent with HEP with handout as needed in  order to maximize ankle strength and ROM/flexibility.    2-3 weeks

## 2024-02-01 ENCOUNTER — TREATMENT (OUTPATIENT)
Dept: PHYSICAL THERAPY | Facility: CLINIC | Age: 64
End: 2024-02-01
Payer: COMMERCIAL

## 2024-02-01 DIAGNOSIS — S82.892D: ICD-10-CM

## 2024-02-01 DIAGNOSIS — S82.892D CLOSED FRACTURE OF LEFT ANKLE, WITH ROUTINE HEALING, SUBSEQUENT ENCOUNTER: ICD-10-CM

## 2024-02-01 PROCEDURE — 97110 THERAPEUTIC EXERCISES: CPT | Mod: GP,CQ

## 2024-02-01 ASSESSMENT — PAIN - FUNCTIONAL ASSESSMENT: PAIN_FUNCTIONAL_ASSESSMENT: 0-10

## 2024-02-01 ASSESSMENT — PAIN SCALES - GENERAL: PAINLEVEL_OUTOF10: 4

## 2024-02-01 NOTE — PROGRESS NOTES
"Physical Therapy Treatment    Patient Name: Natalie Fung  MRN: [id number]  Today's Date: 2/1/2024  Time Calculation  Start Time: 1445  Stop Time: 1528  Time Calculation (min): 43 min      Assessment:   Patient able to tolerate session with mild difficulty. Patient tolerates standing PRE's with no exacerbation of symptoms. Challenged with standing heel raises and lunges, but is able to complete reps.    Plan:  Continue to work on improving strength and ROM to be able to ascend/descend stairs with reciprocal pattern.     OP PT Plan  Treatment/Interventions: Cryotherapy, Education/ Instruction, Hot pack, Gait training, Manual therapy, Neuromuscular re-education, Therapeutic exercises  PT Plan: Skilled PT  PT Frequency:  (1x/wk for 6 weeks or 7 sessions)  Rehab Potential: Good  Plan of Care Agreement: Patient (Patient Goal:  improve strength, ROM and get back to normal activity)    Current Problem  Problem List Items Addressed This Visit             ICD-10-CM    Left malleolar fracture, closed, with routine healing, subsequent encounter S82.892D     Other Visit Diagnoses         Codes    Closed fracture of left ankle, with routine healing, subsequent encounter     S82.892D            Subjective   General  General Comment: Visit # 2/7  Patient states that when she's walking is when she's having pain. States that other than that she's not having pain. States that sleeping is better.     Precautions  Precautions  Precautions Comment: Lower Fall Risk.   PMH:  No significant PMH    Pain  Pain Assessment: 0-10  Pain Score: 4  Pain Location: Ankle  Pain Orientation: Left      Treatments:  Therapeutic Exercise: 40 minutes, 3 units  Recumbent bike x5' lvl 5 (N)  Slantboard 2x1' (N)  Step ups 6\" step 2x10 Fwd/Lateral L leading (N)  BAPs board 2x10 Flex/Ext/Side to side/CW/CCW L   DF Strap Stretch  30 sec hold x 3  Resistive DR/Inv/Ev   x 20 reps ea   BOSU band (N)  Standing heel raises 2x10 Bilat (N)  SLS 3x30\" L (N)  Fwd lunges " 2x10 L leading (N)    OP EDUCATION:   Access Code: [email or web address]     Goals:  Active       PT Problem       PT Goal 1       Start:  24    Expected End:  24       LTG's:  1) Improve L ankle strength from 4-/5  to >= 5-/5 throughout in order to facilitate safe gait and mobility.      4-6 weeks      2) Improve L ankle PROM/AROM from  9 deg DF to >= 13 deg DF  in order to facilitate safe gait and stair negotiation.      4-6 weeks      3) Improve L ankle pain from  5/10 to <= 0-2/10 with activity in order to improve QOL.  4-6 weeks      4) Improve LEFS score by >= 5 points in order to improve QOL.   4-6 weeks      5) Pt will be able to walk longer distances, negotiate stairs, and return to exercising, or work around the home on on the job without significant limitation.      4-6 weeks      ST) Pt/caregiver will be I and consistent with HEP with handout as needed in order to maximize ankle strength and ROM/flexibility.    2-3 weeks

## 2024-02-06 ENCOUNTER — DOCUMENTATION (OUTPATIENT)
Dept: PHYSICAL THERAPY | Facility: CLINIC | Age: 64
End: 2024-02-06
Payer: COMMERCIAL

## 2024-02-06 NOTE — PROGRESS NOTES
Physical Therapy                 Therapy Communication Note    Patient Name: Natalie Fung  MRN: [id number]  Today's Date: 2/6/2024     Discipline: Physical Therapy    Missed Time: No Show    Comment:

## 2024-02-19 ENCOUNTER — APPOINTMENT (OUTPATIENT)
Dept: ORTHOPEDIC SURGERY | Facility: CLINIC | Age: 64
End: 2024-02-19
Payer: COMMERCIAL

## 2024-02-22 ENCOUNTER — OFFICE VISIT (OUTPATIENT)
Dept: ORTHOPEDIC SURGERY | Facility: CLINIC | Age: 64
End: 2024-02-22
Payer: COMMERCIAL

## 2024-02-22 ENCOUNTER — TREATMENT (OUTPATIENT)
Dept: PHYSICAL THERAPY | Facility: CLINIC | Age: 64
End: 2024-02-22
Payer: COMMERCIAL

## 2024-02-22 ENCOUNTER — HOSPITAL ENCOUNTER (OUTPATIENT)
Dept: RADIOLOGY | Facility: CLINIC | Age: 64
Discharge: HOME | End: 2024-02-22
Payer: COMMERCIAL

## 2024-02-22 VITALS — WEIGHT: 151 LBS | BODY MASS INDEX: 25.78 KG/M2 | HEIGHT: 64 IN

## 2024-02-22 DIAGNOSIS — S82.892D: ICD-10-CM

## 2024-02-22 DIAGNOSIS — S82.892D CLOSED FRACTURE OF LEFT ANKLE, WITH ROUTINE HEALING, SUBSEQUENT ENCOUNTER: ICD-10-CM

## 2024-02-22 DIAGNOSIS — S82.892A CLOSED LEFT ANKLE FRACTURE, INITIAL ENCOUNTER: Primary | ICD-10-CM

## 2024-02-22 DIAGNOSIS — S82.892A CLOSED LEFT ANKLE FRACTURE, INITIAL ENCOUNTER: ICD-10-CM

## 2024-02-22 PROCEDURE — 73610 X-RAY EXAM OF ANKLE: CPT | Mod: LEFT SIDE | Performed by: STUDENT IN AN ORGANIZED HEALTH CARE EDUCATION/TRAINING PROGRAM

## 2024-02-22 PROCEDURE — 99213 OFFICE O/P EST LOW 20 MIN: CPT | Performed by: NURSE PRACTITIONER

## 2024-02-22 PROCEDURE — 1036F TOBACCO NON-USER: CPT | Performed by: NURSE PRACTITIONER

## 2024-02-22 PROCEDURE — 73610 X-RAY EXAM OF ANKLE: CPT | Mod: LT

## 2024-02-22 PROCEDURE — 97110 THERAPEUTIC EXERCISES: CPT | Mod: GP | Performed by: PHYSICAL THERAPIST

## 2024-02-22 ASSESSMENT — ENCOUNTER SYMPTOMS
ARTHRALGIAS: 1
ENDOCRINE NEGATIVE: 1
CARDIOVASCULAR NEGATIVE: 1
HEMATOLOGIC/LYMPHATIC NEGATIVE: 1
PSYCHIATRIC NEGATIVE: 1
RESPIRATORY NEGATIVE: 1
NEUROLOGICAL NEGATIVE: 1
CONSTITUTIONAL NEGATIVE: 1

## 2024-02-22 ASSESSMENT — PAIN SCALES - GENERAL
PAINLEVEL_OUTOF10: 3
PAINLEVEL_OUTOF10: 3

## 2024-02-22 ASSESSMENT — PAIN DESCRIPTION - DESCRIPTORS: DESCRIPTORS: DULL;ACHING

## 2024-02-22 ASSESSMENT — PAIN - FUNCTIONAL ASSESSMENT
PAIN_FUNCTIONAL_ASSESSMENT: 0-10
PAIN_FUNCTIONAL_ASSESSMENT: 0-10

## 2024-02-22 NOTE — PROGRESS NOTES
"Physical Therapy Treatment    Patient Name: Natalie Fung  MRN: [id number]  Today's Date: 2/22/2024  Time Calculation  Start Time: 1545  Stop Time: 1626  Time Calculation (min): 41 min      PT Therapeutic Procedures Time Entry  Therapeutic Exercise Time Entry: 40                         General  General Comment: Visit # 3/7    Assessment:   Pt had good overall tolerance to exercises performed in treatment today.  Pt was challenged with ex's performed.  L ankle strength and ROM/flexibility is improving significantly from baseline.          Plan:   OP PT Plan  Treatment/Interventions: Cryotherapy, Education/ Instruction, Hot pack, Gait training, Manual therapy, Neuromuscular re-education, Therapeutic exercises  PT Plan: Skilled PT  PT Frequency:  (1x/wk for 6 weeks or 7 sessions)  Rehab Potential: Good  Plan of Care Agreement: Patient (Patient Goal:  improve strength, ROM and get back to normal activity)    Continue to work on improving strength and ROM to be able to ascend/descend stairs with reciprocal pattern.        Current Problem  Problem List Items Addressed This Visit             ICD-10-CM       Other    Left malleolar fracture, closed, with routine healing, subsequent encounter S82.892D     Other Visit Diagnoses         Codes    Closed fracture of left ankle, with routine healing, subsequent encounter     S82.892D            Subjective  Pt reports that her L ankle is sore at times with activity but is improving overall.  Pt recently saw MD who told pt everything is healing well and looking good on x-ray.      Precautions  Precautions  Precautions Comment: Lower Fall Risk.   PMH:  No significant PMH    Pain  Pain Assessment: 0-10  Pain Score: 3  Pain Location: Ankle  Pain Orientation: Left  Patient's Stated Pain Goal: No pain      Treatments:  Therapeutic Exercise: 40 minutes, 3 units  Recumbent bike x5' lvl 5   Slantboard 2x1'   Step ups 6\" step 2x10 Fwd/Lateral L leading   Standing heel raises on edge of " "step 3-way (in-out-straight)  x 10 reps ea  P  SLS  on airex   3x30\" L   BOSU Lunges  B/L  2 x 10 reps ea  alternating   N  Sports Cord  1 cord  Fwd/Back  x 5 reps ea  N  DF Strap Stretch  30 sec hold x 3  Resistive DF/Inv/Ev   x 20 reps ea   BOSU band     OP EDUCATION:    Access Code: [email or web address]   Edu on proper form and speed of movement with ex's.  Pt verbalized/demonstrated good understanding.         Goals:  Active       PT Problem       PT Goal 1       Start:  24    Expected End:  24       LTG's:  1) Improve L ankle strength from 4-/5  to >= 5-/5 throughout in order to facilitate safe gait and mobility.      4-6 weeks      2) Improve L ankle PROM/AROM from  9 deg DF to >= 13 deg DF  in order to facilitate safe gait and stair negotiation.      4-6 weeks      3) Improve L ankle pain from  5/10 to <= 0-2/10 with activity in order to improve QOL.  4-6 weeks      4) Improve LEFS score by >= 5 points in order to improve QOL.   4-6 weeks      5) Pt will be able to walk longer distances, negotiate stairs, and return to exercising, or work around the home on on the job without significant limitation.      4-6 weeks      ST) Pt/caregiver will be I and consistent with HEP with handout as needed in order to maximize ankle strength and ROM/flexibility.    2-3 weeks              "

## 2024-02-22 NOTE — PROGRESS NOTES
Subjective    Patient ID: Natalie Fung is a 63 y.o. female.    Chief Complaint: Pain of the Left Ankle (FX- XRAYS DONE )     Left Ankle     Natalie is a pleasant 63-year-old female presenting today for FUV left ankle injury and fracture from 12/15/23. Slid on ice, twisted outward and landed. + swelling and pain after, diffiiculty walking. Occasional ice. Boot helps with immobilization.  Progressed to weightbearing to 75% to 100% in the boot.  She does use crutches with work, light activity at home as tolerated well.  No pain or increased swelling noted.  No pain, crutches minimal  No sx aggravation with walking in boot, no meds for pain cotrol needed  HEP 1-2 times daily    Review of Systems   Constitutional: Negative.    HENT: Negative.     Respiratory: Negative.     Cardiovascular: Negative.    Endocrine: Negative.    Musculoskeletal:  Positive for arthralgias.   Skin: Negative.    Neurological: Negative.    Hematological: Negative.    Psychiatric/Behavioral: Negative.        Foot/Ankle Musculoskeletal Exam  Gait    Limp: left    Assistive device: crutches  Gait additional comments:  mostly weightbearing and walking boot, testing at length positive light crutch use    Inspection    Left      Edema: mild        Edema comment: Improved      Ecchymosis: none        Deformity: none        Alignment: normal        Previous ankle incision: no previous incision    Palpation    Left       Tenderness: none        Tenderness comment: none    Range of Motion    Left      Active Dorsiflexion: 10      Active Plantar Flexion: 40      Active Inversion: 20      Active Eversion: 20    Neurovascular    Left      Pulses - DP: normal      Dorsalis pedis: 2+      Capillary refill: brisk and well-perfused    Special Tests    Left      Anterior drawer test: negative      Talar tilt stress test: negative        Membreno's test: negative      Special tests additional comments: Full ROM of distal joints with no sx aggravation, distal  motor and sensory intact    Objective   Left Ankle Exam     Tests   Anterior drawer: negative        Image Results:  Independent review of ankle images compared to prior imaging during today's visit.  There is increased callus formation of the distal fibula, alignment unchanged.  The posterior tibia fracture site with good callus formation, no change in alignment.  Ankle mortise is intact.  Await radiology report.    === 01/02/24 ===    XR ANKLE 3+ VIEWS LEFT    - Impression -  1. Healing nondisplaced Faulkner B distal fibular fracture with  surrounding reactive soft tissue swelling.    MACRO:  None.    Signed by: Sabra Harris 1/3/2024 5:55 PM  Dictation workstation:   [id number]     Assessment/Plan   Encounter Diagnoses:

## 2024-02-29 ENCOUNTER — TREATMENT (OUTPATIENT)
Dept: PHYSICAL THERAPY | Facility: CLINIC | Age: 64
End: 2024-02-29
Payer: COMMERCIAL

## 2024-02-29 DIAGNOSIS — S82.892D CLOSED FRACTURE OF LEFT ANKLE, WITH ROUTINE HEALING, SUBSEQUENT ENCOUNTER: ICD-10-CM

## 2024-02-29 DIAGNOSIS — S82.892D: ICD-10-CM

## 2024-02-29 PROCEDURE — 97110 THERAPEUTIC EXERCISES: CPT | Mod: GP | Performed by: PHYSICAL THERAPIST

## 2024-02-29 ASSESSMENT — PAIN SCALES - GENERAL: PAINLEVEL_OUTOF10: 1

## 2024-02-29 ASSESSMENT — PAIN - FUNCTIONAL ASSESSMENT: PAIN_FUNCTIONAL_ASSESSMENT: 0-10

## 2024-02-29 NOTE — PROGRESS NOTES
"Physical Therapy Treatment    Patient Name: Natalie Fung  MRN: [id number]  Today's Date: 2/29/2024  Time Calculation  Start Time: 1545  Stop Time: 1626  Time Calculation (min): 41 min      PT Therapeutic Procedures Time Entry  Therapeutic Exercise Time Entry: 40                         General  General Comment: Visit # 4/7    Assessment:   Good form with all ex's with minimal cueing needed.  L ankle strength, ROM and proprioception is improving.      Plan:   OP PT Plan  Treatment/Interventions: Cryotherapy, Education/ Instruction, Hot pack, Gait training, Manual therapy, Neuromuscular re-education, Therapeutic exercises  PT Plan: Skilled PT  PT Frequency:  (1x/wk for 6 weeks or 7 sessions)  Rehab Potential: Good  Plan of Care Agreement: Patient (Patient Goal:  improve strength, ROM and get back to normal activity)     Continue to work on improving strength and ROM to be able to ascend/descend stairs with reciprocal pattern.     Current Problem  Problem List Items Addressed This Visit             ICD-10-CM       Other    Left malleolar fracture, closed, with routine healing, subsequent encounter S82.892D     Other Visit Diagnoses         Codes    Closed fracture of left ankle, with routine healing, subsequent encounter     S82.892D            Subjective  Pt reports that her L ankle is doing good with minimal if any pain.  Pt reports she has had some R foot numbness/pins/needles the last couple days that she has not had yet.      Precautions  Precautions  Precautions Comment: Lower Fall Risk.   PMH:  No significant PMH    Pain  Pain Assessment: 0-10  Pain Score: 1  Pain Type: Chronic pain  Pain Location: Ankle  Pain Orientation: Left  Patient's Stated Pain Goal: No pain      Treatments:  Therapeutic Exercise: 40 minutes, 3 units  Recumbent bike x5' lvl 5   Slantboard 2x1'   Step ups 6\" step 2x10 Fwd/Lateral L leading   Standing heel to toe raises on edge of step 3-way (in-out-straight)  x 10 reps ea    SLS  on airex  " " 3x30\" L   BOSU Lunges  B/L  2 x 10 reps ea  alternating     Shuttle Leg Press  B/L / U/L  50#/25#  2 x 10 reps  N  Sports Cord  1 cord  Fwd/Back  x 5 reps ea    DF Strap Stretch  30 sec hold x 3  Resistive DF/Inv/Ev   x 20 reps ea   BOSU band        OP EDUCATION:   Access Code: [email or web address]    Edu on proper form and speed of movement with ex's.  Pt verbalized/demonstrated good understanding.      Goals:  Active       PT Problem       PT Goal 1       Start:  24    Expected End:  24       LTG's:  1) Improve L ankle strength from 4-/5  to >= 5-/5 throughout in order to facilitate safe gait and mobility.      4-6 weeks      2) Improve L ankle PROM/AROM from  9 deg DF to >= 13 deg DF  in order to facilitate safe gait and stair negotiation.      4-6 weeks      3) Improve L ankle pain from  5/10 to <= 0-2/10 with activity in order to improve QOL.  4-6 weeks      4) Improve LEFS score by >= 5 points in order to improve QOL.   4-6 weeks      5) Pt will be able to walk longer distances, negotiate stairs, and return to exercising, or work around the home on on the job without significant limitation.      4-6 weeks      ST) Pt/caregiver will be I and consistent with HEP with handout as needed in order to maximize ankle strength and ROM/flexibility.    2-3 weeks              "

## 2024-03-07 ENCOUNTER — TREATMENT (OUTPATIENT)
Dept: PHYSICAL THERAPY | Facility: CLINIC | Age: 64
End: 2024-03-07
Payer: COMMERCIAL

## 2024-03-07 DIAGNOSIS — S82.892D CLOSED FRACTURE OF LEFT ANKLE, WITH ROUTINE HEALING, SUBSEQUENT ENCOUNTER: ICD-10-CM

## 2024-03-07 DIAGNOSIS — S82.892D: ICD-10-CM

## 2024-03-07 PROCEDURE — 97110 THERAPEUTIC EXERCISES: CPT | Mod: GP | Performed by: PHYSICAL THERAPIST

## 2024-03-07 ASSESSMENT — PAIN - FUNCTIONAL ASSESSMENT: PAIN_FUNCTIONAL_ASSESSMENT: 0-10

## 2024-03-07 ASSESSMENT — PAIN SCALES - GENERAL: PAINLEVEL_OUTOF10: 3

## 2024-03-07 NOTE — PROGRESS NOTES
"Physical Therapy Treatment    Patient Name: Natalie Fung  MRN: [id number]  Today's Date: 3/7/2024  Time Calculation  Start Time: 1545  Stop Time: 1626  Time Calculation (min): 41 min      PT Therapeutic Procedures Time Entry  Therapeutic Exercise Time Entry: 40                         General  General Comment: Visit # 5/7    Assessment:   Pt had good overall tolerance to exercises performed in treatment today.  Pt was challenged with ex's performed.  L ankle strength and balance is improving with PT rx.         Plan:   OP PT Plan  Treatment/Interventions: Cryotherapy, Education/ Instruction, Hot pack, Gait training, Manual therapy, Neuromuscular re-education, Therapeutic exercises  PT Plan: Skilled PT  PT Frequency:  (1x/wk for 6 weeks or 7 sessions)  Rehab Potential: Good  Plan of Care Agreement: Patient (Patient Goal:  improve strength, ROM and get back to normal activity)     Continue to work on improving strength and ROM to be able to ascend/descend stairs with reciprocal pattern.     Current Problem  Problem List Items Addressed This Visit             ICD-10-CM       Other    Left malleolar fracture, closed, with routine healing, subsequent encounter S82.892D    Closed fracture of left ankle, with routine healing, subsequent encounter S82.892D       Subjective  Pt reports that her L ankle is a little sore today from being up and walking a lot today.      Precautions  Precautions  Precautions Comment: Lower Fall Risk.   PMH:  No significant PMH    Pain  Pain Assessment: 0-10  Pain Score: 3  Pain Type: Chronic pain  Pain Location: Ankle  Pain Orientation: Left  Patient's Stated Pain Goal: No pain      Treatments:  Therapeutic Exercise: 40 minutes, 3 units  Recumbent bike x5' lvl 5   Slantboard 2x1'   Step ups 12\" step 2x10 Fwd/Lateral L leading P  Standing heel  raises on edge of step 3-way (in-out-straight)  x 10 reps ea    SLS  on turf with Basketball toss   3x20\" L P  Balance Board on Turf with UE support on " shelving   3 x 20 sec hold  N  BOSU Lunges  B/L  2 x 10 reps ea  alternating     Shuttle Leg Press  B/L / U/L  50#/25#  2 x 10 reps    Sports Cord  1 cord  Fwd/Back/sideways  x 5 reps ea    DF Strap Stretch  30 sec hold x 3  Resistive DF/Inv/Ev   x 20 reps ea   BOSU band        OP EDUCATION:   Access Code: [email or web address]    Edu on proper form and speed of movement with ex's.  Pt verbalized/demonstrated good understanding.    Goals:  Active       PT Problem       PT Goal 1       Start:  24    Expected End:  24       LTG's:  1) Improve L ankle strength from 4-/5  to >= 5-/5 throughout in order to facilitate safe gait and mobility.      4-6 weeks      2) Improve L ankle PROM/AROM from  9 deg DF to >= 13 deg DF  in order to facilitate safe gait and stair negotiation.      4-6 weeks      3) Improve L ankle pain from  5/10 to <= 0-2/10 with activity in order to improve QOL.  4-6 weeks      4) Improve LEFS score by >= 5 points in order to improve QOL.   4-6 weeks      5) Pt will be able to walk longer distances, negotiate stairs, and return to exercising, or work around the home on on the job without significant limitation.      4-6 weeks      ST) Pt/caregiver will be I and consistent with HEP with handout as needed in order to maximize ankle strength and ROM/flexibility.    2-3 weeks

## 2024-03-14 ENCOUNTER — APPOINTMENT (OUTPATIENT)
Dept: PHYSICAL THERAPY | Facility: CLINIC | Age: 64
End: 2024-03-14
Payer: COMMERCIAL

## 2024-03-18 ENCOUNTER — TREATMENT (OUTPATIENT)
Dept: PHYSICAL THERAPY | Facility: CLINIC | Age: 64
End: 2024-03-18
Payer: COMMERCIAL

## 2024-03-18 ENCOUNTER — APPOINTMENT (OUTPATIENT)
Dept: PHYSICAL THERAPY | Facility: CLINIC | Age: 64
End: 2024-03-18
Payer: COMMERCIAL

## 2024-03-18 ENCOUNTER — OFFICE VISIT (OUTPATIENT)
Dept: ORTHOPEDIC SURGERY | Facility: CLINIC | Age: 64
End: 2024-03-18
Payer: COMMERCIAL

## 2024-03-18 DIAGNOSIS — S82.892D CLOSED FRACTURE OF LEFT ANKLE, WITH ROUTINE HEALING, SUBSEQUENT ENCOUNTER: ICD-10-CM

## 2024-03-18 DIAGNOSIS — S82.892A CLOSED LEFT ANKLE FRACTURE, INITIAL ENCOUNTER: Primary | ICD-10-CM

## 2024-03-18 DIAGNOSIS — S82.892D: ICD-10-CM

## 2024-03-18 DIAGNOSIS — S82.892D CLOSED FRACTURE OF LEFT ANKLE, WITH ROUTINE HEALING, SUBSEQUENT ENCOUNTER: Primary | ICD-10-CM

## 2024-03-18 DIAGNOSIS — Z87.81 HISTORY OF ANKLE FRACTURE: ICD-10-CM

## 2024-03-18 PROCEDURE — 99212 OFFICE O/P EST SF 10 MIN: CPT | Performed by: NURSE PRACTITIONER

## 2024-03-18 PROCEDURE — 1036F TOBACCO NON-USER: CPT | Performed by: NURSE PRACTITIONER

## 2024-03-18 PROCEDURE — 97110 THERAPEUTIC EXERCISES: CPT | Mod: GP | Performed by: PHYSICAL THERAPIST

## 2024-03-18 ASSESSMENT — ENCOUNTER SYMPTOMS
RESPIRATORY NEGATIVE: 1
HEMATOLOGIC/LYMPHATIC NEGATIVE: 1
CONSTITUTIONAL NEGATIVE: 1
ENDOCRINE NEGATIVE: 1
PSYCHIATRIC NEGATIVE: 1
CARDIOVASCULAR NEGATIVE: 1
NEUROLOGICAL NEGATIVE: 1
ARTHRALGIAS: 1

## 2024-03-18 ASSESSMENT — PAIN - FUNCTIONAL ASSESSMENT
PAIN_FUNCTIONAL_ASSESSMENT: 0-10
PAIN_FUNCTIONAL_ASSESSMENT: 0-10

## 2024-03-18 ASSESSMENT — PAIN SCALES - GENERAL
PAINLEVEL_OUTOF10: 1
PAINLEVEL_OUTOF10: 1

## 2024-03-18 NOTE — ASSESSMENT & PLAN NOTE
Sx control with OTC NSAIDs per package directions, take with food, Tylenol prn, elevation and ice.  Continue with PT and recommended home exercises for strengthening.  Activities as tolerated with gradual progression.  Discussed use of good footwear for support and OTC shoe inserts as needed.  Plan will be to follow-up here on as needed basis for any concerns, changes or new symptoms..  Patient in agreement with plan of care.  This note was generated using Dragon software.  It may contain errors in wording, punctuation or spelling.
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
## 2024-03-18 NOTE — PROGRESS NOTES
Physical Therapy Discharge/Treatment    Patient Name: Natalie Fung  MRN: [id number]  Today's Date: 3/18/2024  Time Calculation  Start Time: 1000  Stop Time: 1027  Time Calculation (min): 27 min      PT Therapeutic Procedures Time Entry  Therapeutic Exercise Time Entry: 25                         General  General Comment: Visit # 6    Assessment:   The pt has made good objective progress in PT with improved L ankle strength, balance and ROM/flexibility.  The pt has MET her PT goals and is I with current HEP.  DC PT at this time.  Follow up with MD as needed.      Plan:   OP PT Plan  Treatment/Interventions: Cryotherapy, Education/ Instruction, Hot pack, Gait training, Manual therapy, Neuromuscular re-education, Therapeutic exercises  PT Plan: Skilled PT  PT Frequency:  No Further Visits.   DC.    Rehab Potential: Good  Plan of Care Agreement: Patient (Patient Goal:  improve strength, ROM and get back to normal activity)    The pt has made good objective progress in PT with improved L ankle strength, balance and ROM/flexibility.  The pt has MET her PT goals and is I with current HEP.  DC PT at this time.  Follow up with MD as needed.        Current Problem  Problem List Items Addressed This Visit             ICD-10-CM       Other    Left malleolar fracture, closed, with routine healing, subsequent encounter S82.892D    Closed fracture of left ankle, with routine healing, subsequent encounter - Primary S82.892D       Subjective  Pt reports that her L ankle is doing well and that she saw her MD today who released her.  Pt reports that she feels good about DC today and will continue with her ex's as able moving forward.      Precautions  Precautions  Precautions Comment: Lower Fall Risk.   PMH:  No significant PMH    Pain  Pain Assessment: 0-10  Pain Score: 1  Pain Type: Chronic pain  Pain Location: Ankle  Pain Orientation: Left  Patient's Stated Pain Goal: No pain      Treatments:  Therapeutic Exercise: 25 minutes, 2  "units  Recumbent bike x5' lvl 5   Slantboard 2x1'   Step ups 12\" step 2x10 Fwd/Lateral L leading   Standing heel  raises on edge of step 3-way (in-out-straight)  x 10 reps ea    SLS  on airex   3x20\" L   Balance Board on Turf with UE support on shelving   3 x 20 sec hold    3/18/2024 PT DC Summary Completed Today.    Following Ex's X today:    BOSU Lunges  B/L  2 x 10 reps ea  alternating     Shuttle Leg Press  B/L / U/L  50#/25#  2 x 10 reps    Sports Cord  1 cord  Fwd/Back/sideways  x 5 reps ea    DF Strap Stretch  30 sec hold x 3  Resistive DF/Inv/Ev   x 20 reps ea   BOSU band     OP EDUCATION:   Access Code: [email or web address]    Edu on proper form and speed of movement with ex's.  Pt verbalized/demonstrated good understanding.    Goals:  Active       PT Problem       PT Goal 1       Start:  24    Expected End:  24       LTG's:  1) Improve L ankle strength from 4-/5  to >= 5-/5 throughout in order to facilitate safe gait and mobility.      4-6 weeks  3/18/2024, MET, L ankle strength 5-/5 throughout at DC.     2) Improve L ankle PROM/AROM from  9 deg DF to >= 13 deg DF  in order to facilitate safe gait and stair negotiation.      4-6 weeks  3/18/2024, MET, L ankle PROM/AROM > 13 deg DF at DC.     3) Improve L ankle pain from  5/10 to <= 0-2/10 with activity in order to improve QOL.  4-6 weeks  3/18/2024, MET, L ankle pain range  0-2/10 with activity.      4) Improve LEFS score by >= 5 points in order to improve QOL.   4-6 weeks  3/18/2024, MET, pt scored  49 at baseline and a 68 at DC.     5) Pt will be able to walk longer distances, negotiate stairs, and return to exercising, or work around the home on on the job without significant limitation.      4-6 weeks  3/18/2024, MET, pt is doing a lot better overall with all above activity.      ST) Pt/caregiver will be I and consistent with HEP with handout as needed in order to maximize ankle strength and ROM/flexibility.    2-3 weeks  3/18/2024, MET, pt is I " with HEP and has handouts as a reference.

## 2024-03-22 ENCOUNTER — APPOINTMENT (OUTPATIENT)
Dept: ORTHOPEDIC SURGERY | Facility: CLINIC | Age: 64
End: 2024-03-22
Payer: COMMERCIAL

## 2024-09-21 ENCOUNTER — HOSPITAL ENCOUNTER (OUTPATIENT)
Dept: CARDIOLOGY | Facility: HOSPITAL | Age: 64
Discharge: HOME | End: 2024-09-21
Payer: COMMERCIAL

## 2024-09-21 ENCOUNTER — APPOINTMENT (OUTPATIENT)
Dept: RADIOLOGY | Facility: HOSPITAL | Age: 64
End: 2024-09-21
Payer: COMMERCIAL

## 2024-09-21 ENCOUNTER — HOSPITAL ENCOUNTER (EMERGENCY)
Facility: HOSPITAL | Age: 64
Discharge: HOME | End: 2024-09-21
Attending: EMERGENCY MEDICINE
Payer: COMMERCIAL

## 2024-09-21 VITALS
HEIGHT: 64 IN | OXYGEN SATURATION: 98 % | SYSTOLIC BLOOD PRESSURE: 142 MMHG | HEART RATE: 60 BPM | TEMPERATURE: 97.6 F | BODY MASS INDEX: 23.9 KG/M2 | WEIGHT: 140 LBS | DIASTOLIC BLOOD PRESSURE: 74 MMHG | RESPIRATION RATE: 16 BRPM

## 2024-09-21 DIAGNOSIS — S62.101A RIGHT WRIST FRACTURE, CLOSED, INITIAL ENCOUNTER: Primary | ICD-10-CM

## 2024-09-21 DIAGNOSIS — D32.9 MENINGIOMA (MULTI): ICD-10-CM

## 2024-09-21 PROCEDURE — 2500000004 HC RX 250 GENERAL PHARMACY W/ HCPCS (ALT 636 FOR OP/ED): Performed by: EMERGENCY MEDICINE

## 2024-09-21 PROCEDURE — 71045 X-RAY EXAM CHEST 1 VIEW: CPT

## 2024-09-21 PROCEDURE — 93005 ELECTROCARDIOGRAM TRACING: CPT

## 2024-09-21 PROCEDURE — 72125 CT NECK SPINE W/O DYE: CPT

## 2024-09-21 PROCEDURE — 73110 X-RAY EXAM OF WRIST: CPT | Mod: RT

## 2024-09-21 PROCEDURE — 73110 X-RAY EXAM OF WRIST: CPT | Mod: RIGHT SIDE | Performed by: RADIOLOGY

## 2024-09-21 PROCEDURE — 70450 CT HEAD/BRAIN W/O DYE: CPT | Performed by: STUDENT IN AN ORGANIZED HEALTH CARE EDUCATION/TRAINING PROGRAM

## 2024-09-21 PROCEDURE — 96375 TX/PRO/DX INJ NEW DRUG ADDON: CPT

## 2024-09-21 PROCEDURE — 71045 X-RAY EXAM CHEST 1 VIEW: CPT | Mod: FOREIGN READ | Performed by: RADIOLOGY

## 2024-09-21 PROCEDURE — 72125 CT NECK SPINE W/O DYE: CPT | Performed by: STUDENT IN AN ORGANIZED HEALTH CARE EDUCATION/TRAINING PROGRAM

## 2024-09-21 PROCEDURE — 99285 EMERGENCY DEPT VISIT HI MDM: CPT | Mod: 25

## 2024-09-21 PROCEDURE — 96374 THER/PROPH/DIAG INJ IV PUSH: CPT

## 2024-09-21 PROCEDURE — 73130 X-RAY EXAM OF HAND: CPT | Mod: RIGHT SIDE | Performed by: RADIOLOGY

## 2024-09-21 PROCEDURE — 29125 APPL SHORT ARM SPLINT STATIC: CPT | Mod: RT

## 2024-09-21 PROCEDURE — 73130 X-RAY EXAM OF HAND: CPT | Mod: RT

## 2024-09-21 PROCEDURE — 70450 CT HEAD/BRAIN W/O DYE: CPT

## 2024-09-21 RX ORDER — ONDANSETRON HYDROCHLORIDE 2 MG/ML
4 INJECTION, SOLUTION INTRAVENOUS ONCE
Status: COMPLETED | OUTPATIENT
Start: 2024-09-21 | End: 2024-09-21

## 2024-09-21 RX ORDER — MORPHINE SULFATE 2 MG/ML
2 INJECTION, SOLUTION INTRAMUSCULAR; INTRAVENOUS ONCE
Status: COMPLETED | OUTPATIENT
Start: 2024-09-21 | End: 2024-09-21

## 2024-09-21 RX ORDER — TRAMADOL HYDROCHLORIDE 50 MG/1
50 TABLET ORAL EVERY 6 HOURS PRN
Qty: 12 TABLET | Refills: 0 | Status: SHIPPED | OUTPATIENT
Start: 2024-09-21 | End: 2024-09-24

## 2024-09-21 RX ADMIN — ONDANSETRON 4 MG: 2 INJECTION INTRAMUSCULAR; INTRAVENOUS at 13:05

## 2024-09-21 RX ADMIN — MORPHINE SULFATE 2 MG: 2 INJECTION, SOLUTION INTRAMUSCULAR; INTRAVENOUS at 13:08

## 2024-09-21 ASSESSMENT — PAIN DESCRIPTION - ORIENTATION: ORIENTATION: RIGHT

## 2024-09-21 ASSESSMENT — PAIN SCALES - GENERAL
PAINLEVEL_OUTOF10: 6
PAINLEVEL_OUTOF10: 5 - MODERATE PAIN

## 2024-09-21 ASSESSMENT — PAIN - FUNCTIONAL ASSESSMENT: PAIN_FUNCTIONAL_ASSESSMENT: 0-10

## 2024-09-21 ASSESSMENT — PAIN DESCRIPTION - LOCATION: LOCATION: WRIST

## 2024-09-21 ASSESSMENT — COLUMBIA-SUICIDE SEVERITY RATING SCALE - C-SSRS
1. IN THE PAST MONTH, HAVE YOU WISHED YOU WERE DEAD OR WISHED YOU COULD GO TO SLEEP AND NOT WAKE UP?: NO
6. HAVE YOU EVER DONE ANYTHING, STARTED TO DO ANYTHING, OR PREPARED TO DO ANYTHING TO END YOUR LIFE?: NO
2. HAVE YOU ACTUALLY HAD ANY THOUGHTS OF KILLING YOURSELF?: NO
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
## 2024-09-24 ENCOUNTER — HOSPITAL ENCOUNTER (OUTPATIENT)
Dept: RADIOLOGY | Facility: EXTERNAL LOCATION | Age: 64
Discharge: HOME | End: 2024-09-24

## 2024-09-24 ENCOUNTER — OFFICE VISIT (OUTPATIENT)
Dept: ORTHOPEDIC SURGERY | Facility: CLINIC | Age: 64
End: 2024-09-24
Payer: COMMERCIAL

## 2024-09-24 DIAGNOSIS — S62.101A RIGHT WRIST FRACTURE, CLOSED, INITIAL ENCOUNTER: ICD-10-CM

## 2024-09-24 LAB
ATRIAL RATE: 62 BPM
P AXIS: 35 DEGREES
P OFFSET: 187 MS
P ONSET: 126 MS
PR INTERVAL: 184 MS
Q ONSET: 218 MS
QRS COUNT: 10 BEATS
QRS DURATION: 92 MS
QT INTERVAL: 416 MS
QTC CALCULATION(BAZETT): 422 MS
QTC FREDERICIA: 420 MS
R AXIS: 11 DEGREES
T AXIS: 39 DEGREES
T OFFSET: 426 MS
VENTRICULAR RATE: 62 BPM

## 2024-09-24 PROCEDURE — 99214 OFFICE O/P EST MOD 30 MIN: CPT | Performed by: SPECIALIST

## 2024-09-24 PROCEDURE — L3908 WHO COCK-UP NONMOLDE PRE OTS: HCPCS | Performed by: SPECIALIST

## 2024-09-24 PROCEDURE — 76882 US LMTD JT/FCL EVL NVASC XTR: CPT | Performed by: SPECIALIST

## 2024-09-24 PROCEDURE — 1036F TOBACCO NON-USER: CPT | Performed by: SPECIALIST

## 2024-09-24 ASSESSMENT — PAIN - FUNCTIONAL ASSESSMENT: PAIN_FUNCTIONAL_ASSESSMENT: 0-10

## 2024-09-24 ASSESSMENT — PAIN DESCRIPTION - DESCRIPTORS: DESCRIPTORS: THROBBING

## 2024-09-24 ASSESSMENT — PAIN SCALES - GENERAL: PAINLEVEL_OUTOF10: 5 - MODERATE PAIN
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
## 2024-10-01 ENCOUNTER — HOSPITAL ENCOUNTER (OUTPATIENT)
Dept: RADIOLOGY | Facility: CLINIC | Age: 64
Discharge: HOME | End: 2024-10-01
Payer: COMMERCIAL

## 2024-10-01 ENCOUNTER — APPOINTMENT (OUTPATIENT)
Dept: ORTHOPEDIC SURGERY | Facility: CLINIC | Age: 64
End: 2024-10-01
Payer: COMMERCIAL

## 2024-10-01 ENCOUNTER — HOSPITAL ENCOUNTER (OUTPATIENT)
Dept: RADIOLOGY | Facility: EXTERNAL LOCATION | Age: 64
Discharge: HOME | End: 2024-10-01

## 2024-10-01 DIAGNOSIS — S62.101A RIGHT WRIST FRACTURE, CLOSED, INITIAL ENCOUNTER: ICD-10-CM

## 2024-10-01 PROCEDURE — 73110 X-RAY EXAM OF WRIST: CPT | Mod: RIGHT SIDE | Performed by: RADIOLOGY

## 2024-10-01 PROCEDURE — 73110 X-RAY EXAM OF WRIST: CPT | Mod: RT

## 2024-10-01 PROCEDURE — 1036F TOBACCO NON-USER: CPT | Performed by: SPECIALIST

## 2024-10-01 PROCEDURE — 99214 OFFICE O/P EST MOD 30 MIN: CPT | Performed by: SPECIALIST

## 2024-10-01 ASSESSMENT — PAIN SCALES - GENERAL: PAINLEVEL_OUTOF10: 6

## 2024-10-01 ASSESSMENT — PAIN - FUNCTIONAL ASSESSMENT: PAIN_FUNCTIONAL_ASSESSMENT: 0-10

## 2024-10-01 ASSESSMENT — PAIN DESCRIPTION - DESCRIPTORS: DESCRIPTORS: ACHING;DULL

## 2024-10-01 NOTE — ASSESSMENT & PLAN NOTE
Assessment: 10 days status post a impacted right distal radius fracture.  Fracture remains minimally displaced and in a good position for closed treatment.    Plan:  Continue the wrist immobilization on mainly a full-time basis.  She can take the splint off for self-cleaning and very gentle range of motion dorsiflexion and palmar flexion.  She is not to use the wrist for any purposeful activity.  Follow-up in 2 to 3 weeks for reevaluation with new x-rays of the right wrist.

## 2024-10-01 NOTE — PROGRESS NOTES
Assessment/Plan   Encounter Diagnoses:  Right wrist fracture, closed, initial encounter  Right wrist fracture, closed, initial encounter  Assessment: 10 days status post a impacted right distal radius fracture.  Fracture remains minimally displaced and in a good position for closed treatment.    Plan:  Continue the wrist immobilization on mainly a full-time basis.  She can take the splint off for self-cleaning and very gentle range of motion dorsiflexion and palmar flexion.  She is not to use the wrist for any purposeful activity.  Follow-up in 2 to 3 weeks for reevaluation with new x-rays of the right wrist.       Subjective    Patient ID: Natalie Fung is a 64 y.o. female.    Chief Complaint: Pain of the Right Wrist ([facility] ED 9-21-24/X-RAYS 10/01/24/R WRIST FRACTURE)     Last Surgery: No surgery found  Last Surgery Date: No surgery found    HPI  64-year-old who is 10 days status post a fall and impacted distal radius fracture.  She has been compliant with the splinting.  She is tolerating the pain well and states that she is using ibuprofen for this.    OBJECTIVE: ORTHO EXAM  Right wrist/hand:  Moderate swelling and resolving ecchymosis is seen.  She is minimally tender to gentle palpation over the distal radius.  She has full supination and full pronation with gentle active motion.  The bruising is resolving.  The swelling is present but diminishing.  Neurovascular exam normal distally    IMAGE RESULTS:  Point of Care Ultrasound  These images are not reportable by radiology and will not be interpreted   by  Radiologists.      ULTRASOUND  Wrist Ultrasound    DIAGNOSTIC ULTRASOUND REPORT FINAL: Right HAND AND WRIST  Sonographer: Andre Stephenson MD  Procedure: Ultrasound, extremity, nonvascular, real-time, COMPLETE, anatomic specific.  Indication: Wrist Pain  Technique: B-Mode Ultrasound Examination performed using 8-13 MHz linear transducer with [employer]  STUDY TYPE:  1. ULTRASOUND EXTREMITY  2. REAL TIME WITH  IMAGE DOCUMENTATION  3. NON-VASCULAR  4. COMPLETE STUDY  Site: LEFT HAND AND WRIST  Live ultrasound was performed with the patient's HAND AND WRIST and PERMANENTLY documented. COMPLETE and FINAL ULTRASOUND REPORT of the patient's  HAND AND WRIST. . I personally performed the ultrasound and reviewed the findings. These show:    Live ultrasound was performed of the patient's HAND AND WRIST that shows intact Extensor digitorum communis, Extensor digiti minimi, Extensor indicis proprius, Extensor Pollicus Longus, Flexor Pollicus Longus, Flexor digitorum profundus, Flexor digitorum superficialis tendon with the THENAR and HYPOTHENAR muscle fibers showing normal striations. NO FLUID NOTED WITHIN THE CARPAL TUNNEL, THE MEDIAN NERVE SHOWS NORMAL PATTERN OF ECHOGENICITY. The median n. remains swollen.  The tendons appear normal in appearance and size as they pass the styloid process. No fracture is appreciated. Nonsterile gloves were used for this procedure  gauze pads were then used to clean the area after the procedure was compete. The patient tolerated the procedure well.  Remodeling and healing of a distal radius fracture is seen.  There is some impaction dorsally.  There is hematoma noted with some early bridging soft  callus seen.  Procedures     Orders Placed This Encounter    XR wrist right 3+ views    Point of Care Ultrasound

## 2024-10-22 ENCOUNTER — HOSPITAL ENCOUNTER (OUTPATIENT)
Dept: RADIOLOGY | Facility: EXTERNAL LOCATION | Age: 64
Discharge: HOME | End: 2024-10-22

## 2024-10-22 ENCOUNTER — APPOINTMENT (OUTPATIENT)
Dept: ORTHOPEDIC SURGERY | Facility: CLINIC | Age: 64
End: 2024-10-22
Payer: COMMERCIAL

## 2024-10-22 DIAGNOSIS — Z87.81 HISTORY OF WRIST FRACTURE: ICD-10-CM

## 2024-10-22 PROCEDURE — 1036F TOBACCO NON-USER: CPT | Performed by: SPECIALIST

## 2024-10-22 PROCEDURE — 99214 OFFICE O/P EST MOD 30 MIN: CPT | Performed by: SPECIALIST

## 2024-10-22 ASSESSMENT — PAIN DESCRIPTION - DESCRIPTORS: DESCRIPTORS: ACHING

## 2024-10-22 ASSESSMENT — PAIN SCALES - GENERAL: PAINLEVEL_OUTOF10: 3

## 2024-10-22 ASSESSMENT — PAIN - FUNCTIONAL ASSESSMENT: PAIN_FUNCTIONAL_ASSESSMENT: 0-10

## 2024-10-22 NOTE — ASSESSMENT & PLAN NOTE
Assessment: Approximately 1 month status post a impacted right distal radius fracture evaluated in the emergency room on 9/21/2024..  She comes in today for reevaluation.  She refused to obtain x-rays because of their cost.    Plan:  Continue the wrist immobilization on mainly a full-time basis.  Twice a day she can take the splint off and do the gentle range of motion exercises I outlined for her.  Pronation and supination, radial and ulnar deviation, and wrist flexion extension were all demonstrated.  She is not to use the wrist for any purposeful activity.  Follow-up in 3-4 weeks for reevaluation with new x-rays of the right wrist.  She is agreed to obtain x-rays at her next visit.

## 2024-10-22 NOTE — PROGRESS NOTES
Assessment/Plan   Encounter Diagnoses:  History of wrist fracture  Right wrist fracture, closed, initial encounter  Assessment: Approximately 1 month status post a impacted right distal radius fracture evaluated in the emergency room on 9/21/2024..  She comes in today for reevaluation.  She refused to obtain x-rays because of their cost.    Plan:  Continue the wrist immobilization on mainly a full-time basis.  Twice a day she can take the splint off and do the gentle range of motion exercises I outlined for her.  Pronation and supination, radial and ulnar deviation, and wrist flexion extension were all demonstrated.  She is not to use the wrist for any purposeful activity.  Follow-up in 3-4 weeks for reevaluation with new x-rays of the right wrist.  She is agreed to obtain x-rays at her next visit.       Subjective    Patient ID: Natalie Fung is a 64 y.o. female.    Chief Complaint: Pain of the Right Wrist ([facility] ED 9-21-24/X-RAYS 10-1-24/R WRIST FRACTURE)     Last Surgery: No surgery found  Last Surgery Date: No surgery found    HPI  64-year-old comes in a month after her right distal radius fracture which is being treated closed.  She refused x-rays today because of their cost.    OBJECTIVE: ORTHO EXAM  Right wrist  The swelling is diminishing.  The bruising is resolving.  She is mildly tender to palpation over the distal radius.  This is somewhat improved from her last visit.  She has dorsiflexion to 20 degrees and palmar flexion to 30.  She has ulnar deviation to 5 and radial deviation of 5.  She has nearly full pronation and supination to about 65 degrees.  She is neurovascularly intact distally.  She makes three quarters of a full fist.  Strength testing was diminished by pain.  It was also not tested beyond her early pain tolerance.    IMAGE RESULTS:  Point of Care Ultrasound  These images are not reportable by radiology and will not be interpreted   by  Radiologists.      ULTRASOUND  None    Procedures      Orders Placed This Encounter    XR wrist right 3+ views    Point of Care Ultrasound

## 2024-11-15 ENCOUNTER — HOSPITAL ENCOUNTER (OUTPATIENT)
Dept: RADIOLOGY | Facility: CLINIC | Age: 64
Discharge: HOME | End: 2024-11-15
Payer: COMMERCIAL

## 2024-11-15 ENCOUNTER — APPOINTMENT (OUTPATIENT)
Dept: ORTHOPEDIC SURGERY | Facility: CLINIC | Age: 64
End: 2024-11-15
Payer: COMMERCIAL

## 2024-11-15 ENCOUNTER — HOSPITAL ENCOUNTER (OUTPATIENT)
Dept: RADIOLOGY | Facility: EXTERNAL LOCATION | Age: 64
Discharge: HOME | End: 2024-11-15

## 2024-11-15 DIAGNOSIS — S62.101A RIGHT WRIST FRACTURE, CLOSED, INITIAL ENCOUNTER: ICD-10-CM

## 2024-11-15 PROCEDURE — 1036F TOBACCO NON-USER: CPT | Performed by: SPECIALIST

## 2024-11-15 PROCEDURE — 99214 OFFICE O/P EST MOD 30 MIN: CPT | Performed by: SPECIALIST

## 2024-11-15 PROCEDURE — 73110 X-RAY EXAM OF WRIST: CPT | Mod: RT

## 2024-11-15 ASSESSMENT — PAIN - FUNCTIONAL ASSESSMENT: PAIN_FUNCTIONAL_ASSESSMENT: 0-10

## 2024-11-15 ASSESSMENT — PAIN SCALES - GENERAL: PAINLEVEL_OUTOF10: 4

## 2024-11-15 ASSESSMENT — PAIN DESCRIPTION - DESCRIPTORS: DESCRIPTORS: ACHING
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
## 2024-11-15 NOTE — ASSESSMENT & PLAN NOTE
Assessment: Approximately 2 months status post a impacted right distal radius fracture evaluated in the emergency room on 9/21/2024..  She comes in today for reevaluation.      Plan:  Wrist immobilization is now as needed.  I encouraged her to use the immobilizer especially when she is out and about.  If she is focused on her wrist and doing gentle range of motion she does not need to use the wrist immobilizer.  Follow-up in 4 weeks for reevaluation with new x-rays.

## 2024-12-12 ENCOUNTER — OFFICE VISIT (OUTPATIENT)
Dept: NEUROSURGERY | Facility: CLINIC | Age: 64
End: 2024-12-12
Payer: COMMERCIAL

## 2024-12-12 VITALS
BODY MASS INDEX: 24.75 KG/M2 | DIASTOLIC BLOOD PRESSURE: 98 MMHG | WEIGHT: 145 LBS | HEIGHT: 64 IN | SYSTOLIC BLOOD PRESSURE: 178 MMHG | TEMPERATURE: 97.8 F | HEART RATE: 66 BPM

## 2024-12-12 DIAGNOSIS — D32.9 MENINGIOMA (MULTI): Primary | ICD-10-CM

## 2024-12-12 PROCEDURE — 1036F TOBACCO NON-USER: CPT | Performed by: NURSE PRACTITIONER

## 2024-12-12 PROCEDURE — 3008F BODY MASS INDEX DOCD: CPT | Performed by: NURSE PRACTITIONER

## 2024-12-12 PROCEDURE — 99202 OFFICE O/P NEW SF 15 MIN: CPT | Performed by: NURSE PRACTITIONER

## 2024-12-12 PROCEDURE — 99212 OFFICE O/P EST SF 10 MIN: CPT | Performed by: NURSE PRACTITIONER

## 2024-12-12 ASSESSMENT — PAIN SCALES - GENERAL: PAINLEVEL_OUTOF10: 0-NO PAIN

## 2024-12-12 ASSESSMENT — ENCOUNTER SYMPTOMS
LOSS OF SENSATION IN FEET: 1
OCCASIONAL FEELINGS OF UNSTEADINESS: 0
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
## 2024-12-12 NOTE — PATIENT INSTRUCTIONS
Welcome to Alexia Arcos, CNP clinic. We are here to assist you through your Neurological Surgery care at [facility]. My office number is [phone]. This number is the most direct way to communicate with the office. The office fax number is [phone].     A MRI brain w/wo was ordered during your visit today. Please call [phone] to assist you in scheduling your imaging.

## 2024-12-17 ENCOUNTER — HOSPITAL ENCOUNTER (OUTPATIENT)
Dept: RADIOLOGY | Facility: EXTERNAL LOCATION | Age: 64
Discharge: HOME | End: 2024-12-17

## 2024-12-17 ENCOUNTER — HOSPITAL ENCOUNTER (OUTPATIENT)
Dept: RADIOLOGY | Facility: CLINIC | Age: 64
Discharge: HOME | End: 2024-12-17
Payer: COMMERCIAL

## 2024-12-17 ENCOUNTER — APPOINTMENT (OUTPATIENT)
Dept: ORTHOPEDIC SURGERY | Facility: CLINIC | Age: 64
End: 2024-12-17
Payer: COMMERCIAL

## 2024-12-17 DIAGNOSIS — S62.101A RIGHT WRIST FRACTURE, CLOSED, INITIAL ENCOUNTER: ICD-10-CM

## 2024-12-17 PROCEDURE — 73110 X-RAY EXAM OF WRIST: CPT | Mod: RIGHT SIDE | Performed by: RADIOLOGY

## 2024-12-17 PROCEDURE — 99214 OFFICE O/P EST MOD 30 MIN: CPT | Performed by: SPECIALIST

## 2024-12-17 PROCEDURE — 73110 X-RAY EXAM OF WRIST: CPT | Mod: RT

## 2024-12-17 PROCEDURE — 1036F TOBACCO NON-USER: CPT | Performed by: SPECIALIST

## 2024-12-17 ASSESSMENT — PAIN DESCRIPTION - DESCRIPTORS: DESCRIPTORS: ACHING

## 2024-12-17 ASSESSMENT — PAIN SCALES - GENERAL: PAINLEVEL_OUTOF10: 4

## 2024-12-17 ASSESSMENT — PAIN - FUNCTIONAL ASSESSMENT: PAIN_FUNCTIONAL_ASSESSMENT: 0-10

## 2024-12-17 NOTE — ASSESSMENT & PLAN NOTE
Assessment: Approximately 2 months and 3 weeks status post a impacted right distal radius fracture evaluated in the emergency room on 9/21/2024..  She comes in today for reevaluation.      Plan:  Continue with her home exercise program working on strengthening and finishing her full range of motion  Follow-up in 8 weeks for reevaluation with new x-rays right wrist series.
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
## 2024-12-27 ENCOUNTER — HOSPITAL ENCOUNTER (OUTPATIENT)
Dept: RADIOLOGY | Facility: HOSPITAL | Age: 64
Discharge: HOME | End: 2024-12-27
Payer: COMMERCIAL

## 2024-12-27 DIAGNOSIS — D32.9 MENINGIOMA (MULTI): ICD-10-CM

## 2024-12-27 PROCEDURE — 2550000001 HC RX 255 CONTRASTS: Performed by: NURSE PRACTITIONER

## 2024-12-27 PROCEDURE — A9575 INJ GADOTERATE MEGLUMI 0.1ML: HCPCS | Performed by: NURSE PRACTITIONER

## 2024-12-27 PROCEDURE — 70553 MRI BRAIN STEM W/O & W/DYE: CPT

## 2024-12-27 RX ORDER — GADOTERATE MEGLUMINE 376.9 MG/ML
13 INJECTION INTRAVENOUS
Status: COMPLETED | OUTPATIENT
Start: 2024-12-27 | End: 2024-12-27

## 2025-01-08 ENCOUNTER — LAB (OUTPATIENT)
Dept: LAB | Facility: LAB | Age: 65
End: 2025-01-08
Payer: COMMERCIAL

## 2025-01-08 ENCOUNTER — APPOINTMENT (OUTPATIENT)
Dept: PRIMARY CARE | Facility: CLINIC | Age: 65
End: 2025-01-08
Payer: COMMERCIAL

## 2025-01-08 VITALS
BODY MASS INDEX: 25.1 KG/M2 | WEIGHT: 147 LBS | HEIGHT: 64 IN | SYSTOLIC BLOOD PRESSURE: 173 MMHG | HEART RATE: 67 BPM | DIASTOLIC BLOOD PRESSURE: 83 MMHG

## 2025-01-08 DIAGNOSIS — Z00.00 WELLNESS EXAMINATION: ICD-10-CM

## 2025-01-08 DIAGNOSIS — D32.9 MENINGIOMA (MULTI): ICD-10-CM

## 2025-01-08 DIAGNOSIS — Z12.31 ENCOUNTER FOR SCREENING MAMMOGRAM FOR MALIGNANT NEOPLASM OF BREAST: Primary | ICD-10-CM

## 2025-01-08 LAB
ALBUMIN SERPL BCP-MCNC: 4.6 G/DL (ref 3.4–5)
ALP SERPL-CCNC: 70 U/L (ref 33–136)
ALT SERPL W P-5'-P-CCNC: 17 U/L (ref 7–45)
ANION GAP SERPL CALC-SCNC: 11 MMOL/L (ref 10–20)
AST SERPL W P-5'-P-CCNC: 23 U/L (ref 9–39)
BASOPHILS # BLD AUTO: 0.03 X10*3/UL (ref 0–0.1)
BASOPHILS NFR BLD AUTO: 0.5 %
BILIRUB SERPL-MCNC: 1.3 MG/DL (ref 0–1.2)
BUN SERPL-MCNC: 9 MG/DL (ref 6–23)
CALCIUM SERPL-MCNC: 9.3 MG/DL (ref 8.6–10.3)
CHLORIDE SERPL-SCNC: 104 MMOL/L (ref 98–107)
CHOLEST SERPL-MCNC: 244 MG/DL (ref 0–199)
CHOLESTEROL/HDL RATIO: 2
CO2 SERPL-SCNC: 29 MMOL/L (ref 21–32)
CREAT SERPL-MCNC: 0.65 MG/DL (ref 0.5–1.05)
EGFRCR SERPLBLD CKD-EPI 2021: >90 ML/MIN/1.73M*2
EOSINOPHIL # BLD AUTO: 0.11 X10*3/UL (ref 0–0.7)
EOSINOPHIL NFR BLD AUTO: 2 %
ERYTHROCYTE [DISTWIDTH] IN BLOOD BY AUTOMATED COUNT: 12.6 % (ref 11.5–14.5)
GLUCOSE SERPL-MCNC: 91 MG/DL (ref 74–99)
HCT VFR BLD AUTO: 45.4 % (ref 36–46)
HDLC SERPL-MCNC: 121 MG/DL
HGB BLD-MCNC: 14.9 G/DL (ref 12–16)
IMM GRANULOCYTES # BLD AUTO: 0.02 X10*3/UL (ref 0–0.7)
IMM GRANULOCYTES NFR BLD AUTO: 0.4 % (ref 0–0.9)
LDLC SERPL CALC-MCNC: 104 MG/DL
LYMPHOCYTES # BLD AUTO: 1.03 X10*3/UL (ref 1.2–4.8)
LYMPHOCYTES NFR BLD AUTO: 18.6 %
MCH RBC QN AUTO: 30.9 PG (ref 26–34)
MCHC RBC AUTO-ENTMCNC: 32.8 G/DL (ref 32–36)
MCV RBC AUTO: 94 FL (ref 80–100)
MONOCYTES # BLD AUTO: 0.62 X10*3/UL (ref 0.1–1)
MONOCYTES NFR BLD AUTO: 11.2 %
NEUTROPHILS # BLD AUTO: 3.74 X10*3/UL (ref 1.2–7.7)
NEUTROPHILS NFR BLD AUTO: 67.3 %
NON HDL CHOLESTEROL: 123 MG/DL (ref 0–149)
NRBC BLD-RTO: 0 /100 WBCS (ref 0–0)
PLATELET # BLD AUTO: 273 X10*3/UL (ref 150–450)
POTASSIUM SERPL-SCNC: 4 MMOL/L (ref 3.5–5.3)
PROT SERPL-MCNC: 6.7 G/DL (ref 6.4–8.2)
RBC # BLD AUTO: 4.82 X10*6/UL (ref 4–5.2)
SODIUM SERPL-SCNC: 140 MMOL/L (ref 136–145)
TRIGL SERPL-MCNC: 95 MG/DL (ref 0–149)
VLDL: 19 MG/DL (ref 0–40)
WBC # BLD AUTO: 5.6 X10*3/UL (ref 4.4–11.3)

## 2025-01-08 PROCEDURE — 80061 LIPID PANEL: CPT

## 2025-01-08 PROCEDURE — 99396 PREV VISIT EST AGE 40-64: CPT | Performed by: INTERNAL MEDICINE

## 2025-01-08 PROCEDURE — 3008F BODY MASS INDEX DOCD: CPT | Performed by: INTERNAL MEDICINE

## 2025-01-08 PROCEDURE — 99213 OFFICE O/P EST LOW 20 MIN: CPT | Performed by: INTERNAL MEDICINE

## 2025-01-08 PROCEDURE — 80053 COMPREHEN METABOLIC PANEL: CPT

## 2025-01-08 PROCEDURE — 85025 COMPLETE CBC W/AUTO DIFF WBC: CPT

## 2025-01-08 PROCEDURE — 1036F TOBACCO NON-USER: CPT | Performed by: INTERNAL MEDICINE

## 2025-01-08 ASSESSMENT — ENCOUNTER SYMPTOMS
COUGH: 0
ABDOMINAL DISTENTION: 0
WHEEZING: 0
NUMBNESS: 0
BACK PAIN: 0
SORE THROAT: 0
SINUS PAIN: 0
DIARRHEA: 0
APPETITE CHANGE: 0
SINUS PRESSURE: 0
FATIGUE: 0
WEAKNESS: 0
CHILLS: 0
SHORTNESS OF BREATH: 0
ACTIVITY CHANGE: 0
NAUSEA: 0
VOMITING: 0

## 2025-01-08 ASSESSMENT — PROMIS GLOBAL HEALTH SCALE
RATE_QUALITY_OF_LIFE: VERY GOOD
RATE_GENERAL_HEALTH: VERY GOOD
RATE_AVERAGE_FATIGUE: MILD
EMOTIONAL_PROBLEMS: RARELY
RATE_MENTAL_HEALTH: VERY GOOD
RATE_AVERAGE_PAIN: 2
CARRYOUT_PHYSICAL_ACTIVITIES: COMPLETELY
RATE_PHYSICAL_HEALTH: VERY GOOD
RATE_SOCIAL_SATISFACTION: VERY GOOD
CARRYOUT_SOCIAL_ACTIVITIES: VERY GOOD
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
## 2025-01-16 ENCOUNTER — HOSPITAL ENCOUNTER (OUTPATIENT)
Dept: RADIOLOGY | Facility: HOSPITAL | Age: 65
Discharge: HOME | End: 2025-01-16
Payer: COMMERCIAL

## 2025-01-16 DIAGNOSIS — Z12.31 ENCOUNTER FOR SCREENING MAMMOGRAM FOR MALIGNANT NEOPLASM OF BREAST: ICD-10-CM

## 2025-01-16 PROCEDURE — 77067 SCR MAMMO BI INCL CAD: CPT

## 2025-01-23 ENCOUNTER — HOSPITAL ENCOUNTER (OUTPATIENT)
Dept: RADIOLOGY | Facility: EXTERNAL LOCATION | Age: 65
Discharge: HOME | End: 2025-01-23

## 2025-02-11 ENCOUNTER — HOSPITAL ENCOUNTER (OUTPATIENT)
Dept: RADIOLOGY | Facility: EXTERNAL LOCATION | Age: 65
Discharge: HOME | End: 2025-02-11

## 2025-02-11 ENCOUNTER — APPOINTMENT (OUTPATIENT)
Dept: ORTHOPEDIC SURGERY | Facility: CLINIC | Age: 65
End: 2025-02-11
Payer: COMMERCIAL

## 2025-02-11 ENCOUNTER — HOSPITAL ENCOUNTER (OUTPATIENT)
Dept: RADIOLOGY | Facility: CLINIC | Age: 65
Discharge: HOME | End: 2025-02-11
Payer: COMMERCIAL

## 2025-02-11 VITALS — WEIGHT: 148.4 LBS | BODY MASS INDEX: 25.47 KG/M2

## 2025-02-11 DIAGNOSIS — S62.101A RIGHT WRIST FRACTURE, CLOSED, INITIAL ENCOUNTER: ICD-10-CM

## 2025-02-11 DIAGNOSIS — Z87.81 HISTORY OF WRIST FRACTURE: ICD-10-CM

## 2025-02-11 PROCEDURE — 73110 X-RAY EXAM OF WRIST: CPT | Mod: RT

## 2025-02-11 PROCEDURE — 99214 OFFICE O/P EST MOD 30 MIN: CPT | Performed by: SPECIALIST

## 2025-02-11 PROCEDURE — 73110 X-RAY EXAM OF WRIST: CPT | Mod: RIGHT SIDE | Performed by: STUDENT IN AN ORGANIZED HEALTH CARE EDUCATION/TRAINING PROGRAM

## 2025-02-11 PROCEDURE — 1036F TOBACCO NON-USER: CPT | Performed by: SPECIALIST

## 2025-02-11 RX ORDER — BENZONATATE 100 MG/1
CAPSULE ORAL
COMMUNITY
Start: 2025-02-04 | End: 2025-02-11

## 2025-02-11 ASSESSMENT — PAIN DESCRIPTION - DESCRIPTORS: DESCRIPTORS: DULL

## 2025-02-11 ASSESSMENT — PAIN - FUNCTIONAL ASSESSMENT: PAIN_FUNCTIONAL_ASSESSMENT: 0-10

## 2025-02-11 ASSESSMENT — PAIN SCALES - GENERAL: PAINLEVEL_OUTOF10: 1

## 2025-02-11 NOTE — ASSESSMENT & PLAN NOTE
Assessment: Approximately 4 months and 2 weeks status post a impacted right distal radius fracture evaluated in the emergency room on 9/21/2024..  She comes in today for reevaluation.      Plan:  Continue with her home exercise program.  F/U PRN
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
## 2025-04-04 ENCOUNTER — HOSPITAL ENCOUNTER (EMERGENCY)
Facility: HOSPITAL | Age: 65
Discharge: HOME | End: 2025-04-04
Attending: EMERGENCY MEDICINE
Payer: COMMERCIAL

## 2025-04-04 ENCOUNTER — APPOINTMENT (OUTPATIENT)
Dept: RADIOLOGY | Facility: HOSPITAL | Age: 65
End: 2025-04-04
Payer: COMMERCIAL

## 2025-04-04 ENCOUNTER — HOSPITAL ENCOUNTER (OUTPATIENT)
Dept: CARDIOLOGY | Facility: HOSPITAL | Age: 65
Discharge: HOME | End: 2025-04-04
Payer: COMMERCIAL

## 2025-04-04 VITALS
DIASTOLIC BLOOD PRESSURE: 60 MMHG | HEART RATE: 98 BPM | WEIGHT: 140 LBS | HEIGHT: 64 IN | OXYGEN SATURATION: 97 % | TEMPERATURE: 97.9 F | SYSTOLIC BLOOD PRESSURE: 134 MMHG | BODY MASS INDEX: 23.9 KG/M2 | RESPIRATION RATE: 16 BRPM

## 2025-04-04 DIAGNOSIS — I10 UNCONTROLLED HYPERTENSION: Primary | ICD-10-CM

## 2025-04-04 DIAGNOSIS — I10 HYPERTENSION, UNSPECIFIED TYPE: ICD-10-CM

## 2025-04-04 LAB
ALBUMIN SERPL BCP-MCNC: 4.7 G/DL (ref 3.4–5)
ALP SERPL-CCNC: 78 U/L (ref 33–136)
ALT SERPL W P-5'-P-CCNC: 16 U/L (ref 7–45)
ANION GAP SERPL CALC-SCNC: 14 MMOL/L (ref 10–20)
APPEARANCE UR: CLEAR
APTT PPP: 30 SECONDS (ref 26–36)
AST SERPL W P-5'-P-CCNC: 22 U/L (ref 9–39)
BASOPHILS # BLD AUTO: 0.03 X10*3/UL (ref 0–0.1)
BASOPHILS NFR BLD AUTO: 0.5 %
BILIRUB DIRECT SERPL-MCNC: 0.1 MG/DL (ref 0–0.3)
BILIRUB SERPL-MCNC: 0.9 MG/DL (ref 0–1.2)
BILIRUB UR STRIP.AUTO-MCNC: NEGATIVE MG/DL
BUN SERPL-MCNC: 15 MG/DL (ref 6–23)
CALCIUM SERPL-MCNC: 9.4 MG/DL (ref 8.6–10.3)
CHLORIDE SERPL-SCNC: 104 MMOL/L (ref 98–107)
CO2 SERPL-SCNC: 26 MMOL/L (ref 21–32)
COLOR UR: COLORLESS
CREAT SERPL-MCNC: 0.7 MG/DL (ref 0.5–1.05)
EGFRCR SERPLBLD CKD-EPI 2021: >90 ML/MIN/1.73M*2
EOSINOPHIL # BLD AUTO: 0.1 X10*3/UL (ref 0–0.7)
EOSINOPHIL NFR BLD AUTO: 1.7 %
ERYTHROCYTE [DISTWIDTH] IN BLOOD BY AUTOMATED COUNT: 13.7 % (ref 11.5–14.5)
GLUCOSE BLD MANUAL STRIP-MCNC: 90 MG/DL (ref 74–99)
GLUCOSE SERPL-MCNC: 88 MG/DL (ref 74–99)
GLUCOSE UR STRIP.AUTO-MCNC: NORMAL MG/DL
HCT VFR BLD AUTO: 43.7 % (ref 36–46)
HGB BLD-MCNC: 14.5 G/DL (ref 12–16)
IMM GRANULOCYTES # BLD AUTO: 0.02 X10*3/UL (ref 0–0.7)
IMM GRANULOCYTES NFR BLD AUTO: 0.3 % (ref 0–0.9)
INR PPP: 1 (ref 0.9–1.1)
KETONES UR STRIP.AUTO-MCNC: NEGATIVE MG/DL
LEUKOCYTE ESTERASE UR QL STRIP.AUTO: ABNORMAL
LYMPHOCYTES # BLD AUTO: 1.19 X10*3/UL (ref 1.2–4.8)
LYMPHOCYTES NFR BLD AUTO: 20.6 %
MAGNESIUM SERPL-MCNC: 2.24 MG/DL (ref 1.6–2.4)
MCH RBC QN AUTO: 30.6 PG (ref 26–34)
MCHC RBC AUTO-ENTMCNC: 33.2 G/DL (ref 32–36)
MCV RBC AUTO: 92 FL (ref 80–100)
MONOCYTES # BLD AUTO: 0.53 X10*3/UL (ref 0.1–1)
MONOCYTES NFR BLD AUTO: 9.2 %
NEUTROPHILS # BLD AUTO: 3.92 X10*3/UL (ref 1.2–7.7)
NEUTROPHILS NFR BLD AUTO: 67.7 %
NITRITE UR QL STRIP.AUTO: NEGATIVE
NRBC BLD-RTO: 0 /100 WBCS (ref 0–0)
PH UR STRIP.AUTO: 7 [PH]
PLATELET # BLD AUTO: 248 X10*3/UL (ref 150–450)
POTASSIUM SERPL-SCNC: 4.1 MMOL/L (ref 3.5–5.3)
PROT SERPL-MCNC: 6.5 G/DL (ref 6.4–8.2)
PROT UR STRIP.AUTO-MCNC: NEGATIVE MG/DL
PROTHROMBIN TIME: 10.7 SECONDS (ref 9.8–12.4)
RBC # BLD AUTO: 4.74 X10*6/UL (ref 4–5.2)
RBC # UR STRIP.AUTO: NEGATIVE MG/DL
RBC #/AREA URNS AUTO: NORMAL /HPF
SODIUM SERPL-SCNC: 140 MMOL/L (ref 136–145)
SP GR UR STRIP.AUTO: 1
UROBILINOGEN UR STRIP.AUTO-MCNC: NORMAL MG/DL
WBC # BLD AUTO: 5.8 X10*3/UL (ref 4.4–11.3)
WBC #/AREA URNS AUTO: NORMAL /HPF

## 2025-04-04 PROCEDURE — 2500000004 HC RX 250 GENERAL PHARMACY W/ HCPCS (ALT 636 FOR OP/ED): Performed by: EMERGENCY MEDICINE

## 2025-04-04 PROCEDURE — 81001 URINALYSIS AUTO W/SCOPE: CPT | Performed by: EMERGENCY MEDICINE

## 2025-04-04 PROCEDURE — 96376 TX/PRO/DX INJ SAME DRUG ADON: CPT

## 2025-04-04 PROCEDURE — 85610 PROTHROMBIN TIME: CPT | Performed by: EMERGENCY MEDICINE

## 2025-04-04 PROCEDURE — 87086 URINE CULTURE/COLONY COUNT: CPT | Mod: SAMLAB | Performed by: EMERGENCY MEDICINE

## 2025-04-04 PROCEDURE — 93005 ELECTROCARDIOGRAM TRACING: CPT

## 2025-04-04 PROCEDURE — 36415 COLL VENOUS BLD VENIPUNCTURE: CPT | Performed by: EMERGENCY MEDICINE

## 2025-04-04 PROCEDURE — 71045 X-RAY EXAM CHEST 1 VIEW: CPT | Mod: FOREIGN READ | Performed by: RADIOLOGY

## 2025-04-04 PROCEDURE — 99285 EMERGENCY DEPT VISIT HI MDM: CPT | Mod: 25 | Performed by: EMERGENCY MEDICINE

## 2025-04-04 PROCEDURE — 82248 BILIRUBIN DIRECT: CPT | Performed by: EMERGENCY MEDICINE

## 2025-04-04 PROCEDURE — 85730 THROMBOPLASTIN TIME PARTIAL: CPT | Performed by: EMERGENCY MEDICINE

## 2025-04-04 PROCEDURE — 70450 CT HEAD/BRAIN W/O DYE: CPT | Performed by: STUDENT IN AN ORGANIZED HEALTH CARE EDUCATION/TRAINING PROGRAM

## 2025-04-04 PROCEDURE — 83735 ASSAY OF MAGNESIUM: CPT | Performed by: EMERGENCY MEDICINE

## 2025-04-04 PROCEDURE — 82947 ASSAY GLUCOSE BLOOD QUANT: CPT

## 2025-04-04 PROCEDURE — 80048 BASIC METABOLIC PNL TOTAL CA: CPT | Performed by: EMERGENCY MEDICINE

## 2025-04-04 PROCEDURE — 85025 COMPLETE CBC W/AUTO DIFF WBC: CPT | Performed by: EMERGENCY MEDICINE

## 2025-04-04 PROCEDURE — 70450 CT HEAD/BRAIN W/O DYE: CPT

## 2025-04-04 PROCEDURE — 71045 X-RAY EXAM CHEST 1 VIEW: CPT

## 2025-04-04 PROCEDURE — 96374 THER/PROPH/DIAG INJ IV PUSH: CPT

## 2025-04-04 RX ORDER — HYDRALAZINE HYDROCHLORIDE 20 MG/ML
10 INJECTION INTRAMUSCULAR; INTRAVENOUS ONCE
Status: COMPLETED | OUTPATIENT
Start: 2025-04-04 | End: 2025-04-04

## 2025-04-04 RX ORDER — HYDRALAZINE HYDROCHLORIDE 20 MG/ML
20 INJECTION INTRAMUSCULAR; INTRAVENOUS ONCE
Status: COMPLETED | OUTPATIENT
Start: 2025-04-04 | End: 2025-04-04

## 2025-04-04 RX ORDER — LISINOPRIL 10 MG/1
10 TABLET ORAL DAILY
Qty: 20 TABLET | Refills: 0 | Status: SHIPPED | OUTPATIENT
Start: 2025-04-04 | End: 2025-04-24

## 2025-04-04 RX ORDER — B-COMPLEX WITH VITAMIN C
1 TABLET ORAL DAILY
COMMUNITY

## 2025-04-04 RX ADMIN — HYDRALAZINE HYDROCHLORIDE 10 MG: 20 INJECTION INTRAMUSCULAR; INTRAVENOUS at 20:09

## 2025-04-04 RX ADMIN — HYDRALAZINE HYDROCHLORIDE 10 MG: 20 INJECTION INTRAMUSCULAR; INTRAVENOUS at 18:54

## 2025-04-04 ASSESSMENT — COLUMBIA-SUICIDE SEVERITY RATING SCALE - C-SSRS
2. HAVE YOU ACTUALLY HAD ANY THOUGHTS OF KILLING YOURSELF?: NO
1. IN THE PAST MONTH, HAVE YOU WISHED YOU WERE DEAD OR WISHED YOU COULD GO TO SLEEP AND NOT WAKE UP?: NO
6. HAVE YOU EVER DONE ANYTHING, STARTED TO DO ANYTHING, OR PREPARED TO DO ANYTHING TO END YOUR LIFE?: NO

## 2025-04-04 ASSESSMENT — ENCOUNTER SYMPTOMS
CONFUSION: 0
SINUS PAIN: 0
VOMITING: 0
NECK PAIN: 0
DYSURIA: 0
PALPITATIONS: 0
SHORTNESS OF BREATH: 0
BRUISES/BLEEDS EASILY: 0
BACK PAIN: 0
MYALGIAS: 0
ARTHRALGIAS: 0
CHILLS: 0
PSYCHIATRIC NEGATIVE: 1
NECK STIFFNESS: 0
ABDOMINAL PAIN: 0
ADENOPATHY: 0
FATIGUE: 0
WEAKNESS: 0
FEVER: 0
CHEST TIGHTNESS: 0
DIZZINESS: 1
NAUSEA: 0

## 2025-04-04 ASSESSMENT — PAIN - FUNCTIONAL ASSESSMENT: PAIN_FUNCTIONAL_ASSESSMENT: 0-10

## 2025-04-04 ASSESSMENT — PAIN SCALES - GENERAL: PAINLEVEL_OUTOF10: 0 - NO PAIN

## 2025-04-04 NOTE — ED PROVIDER NOTES
Chief Complaint: HIGH BP DIZZY    This is a 64-year-old female complains of sudden onset of dizziness with elevated blood pressure while she is working on her computer this afternoon.  He has similar episode last week while driving but it resolved spontaneously.  She denies any headache no chest pain no shortness of breath no nausea or vomiting otherwise.  She has no history of hypertension.  She does have a history of an meningioma that was found incidentally and that they are monitoring currently.  She presents now for evaluation.  Her blood pressure at home was 190 systolic.  Additionally she states she has whitecoat syndrome and when in any medical facility her blood pressure always runs higher.           Review of Systems   Constitutional:  Negative for chills, fatigue and fever.   HENT:  Negative for congestion, sinus pain and tinnitus.    Eyes:  Positive for visual disturbance.   Respiratory:  Negative for chest tightness and shortness of breath.    Cardiovascular:  Negative for chest pain and palpitations.   Gastrointestinal:  Negative for abdominal pain, nausea and vomiting.   Genitourinary:  Negative for dysuria.   Musculoskeletal:  Negative for arthralgias, back pain, myalgias, neck pain and neck stiffness.   Skin:  Negative for rash.        Flushing of her skin   Neurological:  Positive for dizziness. Negative for weakness.   Hematological:  Negative for adenopathy. Does not bruise/bleed easily.   Psychiatric/Behavioral: Negative.  Negative for confusion.    All other systems reviewed and are negative.       Physical Exam  Vitals reviewed.   Constitutional:       Appearance: Normal appearance. She is not toxic-appearing.      Comments: Is awake cooperative Thomas Coma Scale 15 her blood pressure systolic is 216 at this time   HENT:      Head: Normocephalic and atraumatic.      Right Ear: Tympanic membrane normal.      Left Ear: Tympanic membrane normal.      Nose: Nose normal. No congestion.       Mouth/Throat:      Mouth: Mucous membranes are dry.      Pharynx: Oropharynx is clear.   Eyes:      Extraocular Movements: Extraocular movements intact.      Conjunctiva/sclera: Conjunctivae normal.      Pupils: Pupils are equal, round, and reactive to light.   Neck:      Vascular: No carotid bruit.   Cardiovascular:      Rate and Rhythm: Normal rate.      Pulses: Normal pulses.      Heart sounds: No murmur heard.  Pulmonary:      Effort: Pulmonary effort is normal.      Breath sounds: Normal breath sounds. No wheezing.   Abdominal:      General: There is no distension.      Palpations: Abdomen is soft.      Tenderness: There is no abdominal tenderness.   Musculoskeletal:         General: No swelling or tenderness. Normal range of motion.      Cervical back: Normal range of motion and neck supple. No rigidity or tenderness.      Right lower leg: No edema.      Left lower leg: No edema.   Skin:     General: Skin is warm and dry.      Capillary Refill: Capillary refill takes less than 2 seconds.      Findings: No rash.   Neurological:      General: No focal deficit present.      Mental Status: She is alert and oriented to person, place, and time.      GCS: GCS eye subscore is 4. GCS verbal subscore is 5. GCS motor subscore is 6.      Cranial Nerves: Cranial nerves 2-12 are intact. No dysarthria.      Sensory: Sensation is intact. No sensory deficit.      Motor: Motor function is intact. No weakness.      Coordination: Coordination is intact.   Psychiatric:         Mood and Affect: Mood normal.         Behavior: Behavior normal.          Labs Reviewed   POCT GLUCOSE - Normal       Result Value    POCT Glucose 90     URINALYSIS WITH REFLEX CULTURE AND MICROSCOPIC    Narrative:     The following orders were created for panel order Urinalysis with Reflex Culture and Microscopic.  Procedure                               Abnormality         Status                     ---------                               -----------          ------                     Urinalysis with Reflex C...[[id number]]                                                 Extra Urine Gray Tube[[id number]]                                                         Please view results for these tests on the individual orders.   CBC WITH AUTO DIFFERENTIAL   BASIC METABOLIC PANEL   MAGNESIUM   HEPATIC FUNCTION PANEL   PROTIME-INR   APTT   URINALYSIS WITH REFLEX CULTURE AND MICROSCOPIC   EXTRA URINE GRAY TUBE        XR chest 1 view    (Results Pending)   CT head wo IV contrast    (Results Pending)        Procedures     Medical Decision Making  Differential diagnosis include hypertensive crisis uncontrolled hypertension pheochromocytoma electrolyte abnormalities renal dysfunction.  EKG was unremarkable.  Complete cardiac and neurowork-up was ordered including cardiac enzymes all appropriate labs chest x-ray EKG CT scan of her head and hydralazine 20 mg intravenously since she had a heart rate of only 70 and beta-blocker not indicated giving her relatively slower heart rate at this time.  Patient's blood pressure did come down slightly to 197 systolic she was given half her hydralazine dose for dose of 10 mg since she was going to CT scan with reassessment upon return.  Patient's labs and CT were pending and patient was signed out to Dr. Quezada the incoming physician for definitive diagnosis and treatment plan at this time    Amount and/or Complexity of Data Reviewed  ECG/medicine tests: independent interpretation performed.     Details: Of lead EKG showed sinus rhythm at 70/min there is an ossific ST abnormalities questionable in a sinus arrhythmia as interpreted by myself the emergency physician         Diagnoses as of 04/04/25 1847   Uncontrolled hypertension                    James Yañez MD  04/04/25 1851

## 2025-04-05 NOTE — PROGRESS NOTES
Emergency Medicine Transition of Care Note.    I received Natalie Fung in signout from Dr. Yañez.  Please see the previous ED provider note for all HPI, PE and MDM up to the time of signout at 1900 hrs. This is in addition to the primary record.    In brief Natalie Fung is an 64 y.o. female presenting for   Chief Complaint   Patient presents with    Dizziness     Drove self to ED from home after a sudden onset of dizziness while sitting at the computer working--no N/V, no SOB, no CP--no room spinning--no unsteady gait--had mildly blurry vision that has now cleared--also reports high blood pressure at home with a wrist cuff--no dx of HTN--no medds for HTN--     At the time of signout we were awaiting: Labs/imaging    Diagnoses as of 04/04/25 2051   Uncontrolled hypertension       Medical Decision Making  Patient was endorsed to me from the daytime provider.  Please see their notes for prior history and physical exam details.    Patient's blood pressure came down considerably with hydralazine here.  Patient is feeling better.  Workup is unremarkable.  Meningioma on CAT scan is known for her.    Patient will be discharged with a prescription for lisinopril.  Referral to primary care for follow-up.  Continue to monitor blood pressure at home which she already does and return at anytime if worse.    Final diagnoses:   [I10] Uncontrolled hypertension           Procedure  Procedures    Walker Quezada DO

## 2025-04-06 LAB — BACTERIA UR CULT: NORMAL

## 2025-04-08 LAB
ATRIAL RATE: 70 BPM
P AXIS: 82 DEGREES
P OFFSET: 191 MS
P ONSET: 129 MS
PR INTERVAL: 174 MS
Q ONSET: 216 MS
QRS COUNT: 12 BEATS
QRS DURATION: 90 MS
QT INTERVAL: 390 MS
QTC CALCULATION(BAZETT): 421 MS
QTC FREDERICIA: 410 MS
R AXIS: 62 DEGREES
T AXIS: 84 DEGREES
T OFFSET: 411 MS
VENTRICULAR RATE: 70 BPM

## 2025-04-16 ENCOUNTER — APPOINTMENT (OUTPATIENT)
Age: 65
End: 2025-04-16
Payer: COMMERCIAL

## 2025-04-16 VITALS
BODY MASS INDEX: 25.53 KG/M2 | WEIGHT: 149.56 LBS | DIASTOLIC BLOOD PRESSURE: 80 MMHG | SYSTOLIC BLOOD PRESSURE: 138 MMHG | HEART RATE: 63 BPM | HEIGHT: 64 IN

## 2025-04-16 DIAGNOSIS — I10 PRIMARY HYPERTENSION: ICD-10-CM

## 2025-04-16 DIAGNOSIS — Z13.6 SCREENING, ISCHEMIC HEART DISEASE: ICD-10-CM

## 2025-04-16 DIAGNOSIS — Z12.11 COLON CANCER SCREENING: ICD-10-CM

## 2025-04-16 DIAGNOSIS — Z76.89 ENCOUNTER TO ESTABLISH CARE: Primary | ICD-10-CM

## 2025-04-16 DIAGNOSIS — D32.9 MENINGIOMA (MULTI): ICD-10-CM

## 2025-04-16 PROCEDURE — 99214 OFFICE O/P EST MOD 30 MIN: CPT | Performed by: NURSE PRACTITIONER

## 2025-04-16 PROCEDURE — 3075F SYST BP GE 130 - 139MM HG: CPT | Performed by: NURSE PRACTITIONER

## 2025-04-16 PROCEDURE — 3079F DIAST BP 80-89 MM HG: CPT | Performed by: NURSE PRACTITIONER

## 2025-04-16 PROCEDURE — 3008F BODY MASS INDEX DOCD: CPT | Performed by: NURSE PRACTITIONER

## 2025-04-16 PROCEDURE — 1036F TOBACCO NON-USER: CPT | Performed by: NURSE PRACTITIONER

## 2025-04-16 RX ORDER — LISINOPRIL 10 MG/1
10 TABLET ORAL DAILY
Qty: 90 TABLET | Refills: 1 | Status: SHIPPED | OUTPATIENT
Start: 2025-04-16

## 2025-04-16 ASSESSMENT — ENCOUNTER SYMPTOMS
OCCASIONAL FEELINGS OF UNSTEADINESS: 0
DEPRESSION: 0
LOSS OF SENSATION IN FEET: 1

## 2025-04-16 NOTE — PROGRESS NOTES
"Subjective   Patient ID: Natalie Fung is a 64 y.o. female who presents for Establish Care ([facility]  Hypertension ).    HPI   Natalie here to establish care. Previous patient of Dr. De La O. Has history of elevated blood pressure, meningioma, hyperlipidemia.       HTN: was noted to have high blood pressure in other offices, and places and was being monitored. She was started on BP medication by ER as she went on the 4th. She had an episode of dizziness while working on her computer. She had reported that this happened one time prior to her seeking emergent care.     Meningioma: recent diagnosis-being watched.     Hyperlipidemia: will get CT calcium coronary score. Her lipid panel has been elevated. Currently not taking a statin.     Mammogram: 1/6/25  Cologuard: ordered    Review of Systems   Constitutional:  Negative for fatigue.   Respiratory:  Negative for chest tightness and shortness of breath.    Cardiovascular:  Negative for chest pain, palpitations and leg swelling.   Gastrointestinal:  Negative for abdominal pain, blood in stool, constipation, diarrhea, nausea and vomiting.   Genitourinary:  Negative for dysuria.   Musculoskeletal:  Negative for arthralgias and myalgias.   Skin:  Negative for color change.   Neurological:  Negative for dizziness, light-headedness and headaches.   Psychiatric/Behavioral: Negative.         Objective   /80   Pulse 63   Ht 1.626 m (5' 4\")   Wt 67.8 kg (149 lb 9 oz)   LMP  (LMP Unknown)   BMI 25.67 kg/m²     Physical Exam  Vitals and nursing note reviewed.   Constitutional:       Appearance: Normal appearance.   HENT:      Head: Normocephalic and atraumatic.   Cardiovascular:      Rate and Rhythm: Normal rate and regular rhythm.      Pulses: Normal pulses.      Heart sounds: Normal heart sounds.   Pulmonary:      Effort: Pulmonary effort is normal.      Breath sounds: Normal breath sounds.   Abdominal:      General: Bowel sounds are normal.      Palpations: " "Abdomen is soft.   Skin:     General: Skin is warm and dry.   Neurological:      General: No focal deficit present.      Mental Status: She is alert and oriented to person, place, and time.   Psychiatric:         Mood and Affect: Mood normal.         Behavior: Behavior normal.         Thought Content: Thought content normal.         Judgment: Judgment normal.         Assessment/Plan   Problem List Items Addressed This Visit           ICD-10-CM    BMI 25.0-25.9,adult Z68.25    Meningioma (Multi) D32.9    Sees neurosurgery  Stable-\"watching\"         Primary hypertension I10    Continue lisinopril 10 mg daily - refilled         Relevant Medications    lisinopril 10 mg tablet     Other Visit Diagnoses         Codes      Encounter to establish care    -  Primary Z76.89      Colon cancer screening     Z12.11    Relevant Orders    Cologuard® colon cancer screening      Screening, ischemic heart disease     Z13.6    Relevant Orders    CT cardiac scoring wo IV contrast              Follow up in 3 months for HTN check. She will return sooner if she notices her BP higher at home. She also just had lab work in January. We will also contact her after she gets her CT cardiac score results.     For any new medications that were prescribed today, the patient was educated about their indications for use, administration, frequency and potential side effects of the medication.  "

## 2025-04-20 PROBLEM — S82.892A CLOSED LEFT ANKLE FRACTURE, INITIAL ENCOUNTER: Status: RESOLVED | Noted: 2023-12-01 | Resolved: 2025-04-20

## 2025-04-20 PROBLEM — I10 PRIMARY HYPERTENSION: Status: ACTIVE | Noted: 2025-04-20

## 2025-04-20 PROBLEM — D32.9 MENINGIOMA (MULTI): Status: ACTIVE | Noted: 2025-04-20

## 2025-04-20 PROBLEM — S82.892D CLOSED FRACTURE OF LEFT ANKLE, WITH ROUTINE HEALING, SUBSEQUENT ENCOUNTER: Status: RESOLVED | Noted: 2024-03-07 | Resolved: 2025-04-20

## 2025-04-20 PROBLEM — S62.101A RIGHT WRIST FRACTURE, CLOSED, INITIAL ENCOUNTER: Status: RESOLVED | Noted: 2024-09-24 | Resolved: 2025-04-20

## 2025-04-20 PROBLEM — S82.892D: Status: RESOLVED | Noted: 2024-03-07 | Resolved: 2025-04-20

## 2025-04-20 PROBLEM — E78.2 MIXED HYPERLIPIDEMIA: Status: ACTIVE | Noted: 2025-04-16

## 2025-04-20 ASSESSMENT — ENCOUNTER SYMPTOMS
FATIGUE: 0
LIGHT-HEADEDNESS: 0
CONSTIPATION: 0
DYSURIA: 0
NAUSEA: 0
PALPITATIONS: 0
CHEST TIGHTNESS: 0
BLOOD IN STOOL: 0
ARTHRALGIAS: 0
VOMITING: 0
PSYCHIATRIC NEGATIVE: 1
MYALGIAS: 0
ABDOMINAL PAIN: 0
DIZZINESS: 0
HEADACHES: 0
COLOR CHANGE: 0
SHORTNESS OF BREATH: 0
DIARRHEA: 0

## 2025-04-25 LAB — NONINV COLON CA DNA+OCC BLD SCRN STL QL: NEGATIVE

## 2025-05-05 ENCOUNTER — APPOINTMENT (OUTPATIENT)
Dept: PRIMARY CARE | Facility: CLINIC | Age: 65
End: 2025-05-05
Payer: COMMERCIAL

## 2025-05-08 ENCOUNTER — HOSPITAL ENCOUNTER (OUTPATIENT)
Dept: RADIOLOGY | Facility: HOSPITAL | Age: 65
Discharge: HOME | End: 2025-05-08
Payer: COMMERCIAL

## 2025-05-08 DIAGNOSIS — Z13.6 SCREENING, ISCHEMIC HEART DISEASE: ICD-10-CM

## 2025-05-08 PROCEDURE — 75571 CT HRT W/O DYE W/CA TEST: CPT

## 2025-06-20 DIAGNOSIS — K76.9 LIVER LESION, LEFT LOBE: ICD-10-CM

## 2025-06-20 DIAGNOSIS — R93.89 ABNORMAL FINDING ON CT SCAN: Primary | ICD-10-CM

## 2025-06-20 DIAGNOSIS — I77.89 ASCENDING AORTA ENLARGEMENT: ICD-10-CM

## 2025-06-20 DIAGNOSIS — I51.7 ENLARGED HEART: ICD-10-CM

## 2025-06-25 ENCOUNTER — HOSPITAL ENCOUNTER (OUTPATIENT)
Dept: CARDIOLOGY | Facility: HOSPITAL | Age: 65
Discharge: HOME | End: 2025-06-25
Payer: COMMERCIAL

## 2025-06-25 DIAGNOSIS — R01.1 CARDIAC MURMUR, UNSPECIFIED: ICD-10-CM

## 2025-06-25 DIAGNOSIS — R93.89 ABNORMAL FINDING ON CT SCAN: ICD-10-CM

## 2025-06-25 DIAGNOSIS — I77.89 ASCENDING AORTA ENLARGEMENT: ICD-10-CM

## 2025-06-25 DIAGNOSIS — I51.7 ENLARGED HEART: ICD-10-CM

## 2025-06-25 LAB
AORTIC VALVE MEAN GRADIENT: 10 MMHG
AORTIC VALVE PEAK VELOCITY: 2.24 M/S
AV PEAK GRADIENT: 20 MMHG
AVA (PEAK VEL): 1.98 CM2
AVA (VTI): 2.11 CM2
EJECTION FRACTION APICAL 4 CHAMBER: 66.3
EJECTION FRACTION: 63 %
LEFT ATRIUM VOLUME AREA LENGTH INDEX BSA: 5 ML/M2
LEFT VENTRICLE INTERNAL DIMENSION DIASTOLE: 4.17 CM (ref 3.5–6)
LEFT VENTRICULAR OUTFLOW TRACT DIAMETER: 1.8 CM
LV EJECTION FRACTION BIPLANE: 66 %
MITRAL VALVE E/A RATIO: 1.35
RIGHT VENTRICLE FREE WALL PEAK S': 14.1 CM/S
RIGHT VENTRICLE PEAK SYSTOLIC PRESSURE: 31 MMHG
TRICUSPID ANNULAR PLANE SYSTOLIC EXCURSION: 2.3 CM

## 2025-06-25 PROCEDURE — 93306 TTE W/DOPPLER COMPLETE: CPT | Performed by: INTERNAL MEDICINE

## 2025-06-25 PROCEDURE — 93306 TTE W/DOPPLER COMPLETE: CPT

## 2025-06-27 ENCOUNTER — HOSPITAL ENCOUNTER (OUTPATIENT)
Dept: RADIOLOGY | Facility: HOSPITAL | Age: 65
Discharge: HOME | End: 2025-06-27
Payer: COMMERCIAL

## 2025-06-27 DIAGNOSIS — D32.9 MENINGIOMA (MULTI): ICD-10-CM

## 2025-06-27 DIAGNOSIS — K76.9 LIVER LESION, LEFT LOBE: ICD-10-CM

## 2025-06-27 DIAGNOSIS — R93.89 ABNORMAL FINDING ON CT SCAN: ICD-10-CM

## 2025-06-27 PROCEDURE — 76705 ECHO EXAM OF ABDOMEN: CPT

## 2025-06-27 PROCEDURE — 70553 MRI BRAIN STEM W/O & W/DYE: CPT

## 2025-06-27 PROCEDURE — 2550000001 HC RX 255 CONTRASTS: Mod: JW | Performed by: NURSE PRACTITIONER

## 2025-06-27 PROCEDURE — A9575 INJ GADOTERATE MEGLUMI 0.1ML: HCPCS | Mod: JW | Performed by: NURSE PRACTITIONER

## 2025-06-27 RX ORDER — GADOTERATE MEGLUMINE 376.9 MG/ML
13 INJECTION INTRAVENOUS
Status: COMPLETED | OUTPATIENT
Start: 2025-06-27 | End: 2025-06-27

## 2025-06-27 RX ADMIN — GADOTERATE MEGLUMINE 13 ML: 376.9 INJECTION INTRAVENOUS at 09:08

## 2025-07-02 DIAGNOSIS — R93.1 ABNORMAL ECHOCARDIOGRAM: Primary | ICD-10-CM

## 2025-07-02 DIAGNOSIS — I77.810 DILATATION OF THORACIC AORTA: ICD-10-CM

## 2025-07-11 DIAGNOSIS — I10 PRIMARY HYPERTENSION: ICD-10-CM

## 2025-07-11 RX ORDER — LISINOPRIL 10 MG/1
10 TABLET ORAL DAILY
Qty: 90 TABLET | Refills: 0 | Status: SHIPPED | OUTPATIENT
Start: 2025-07-11

## 2025-07-23 ENCOUNTER — APPOINTMENT (OUTPATIENT)
Age: 65
End: 2025-07-23
Payer: COMMERCIAL

## 2025-07-25 NOTE — PROGRESS NOTES
CHIEF COMPLAINT  Referred by PCP, Erica Napier, for CV evaluation    HISTORY OF PRESENT ILLNESS    Natalie Fung is a 65-year-old female with a history of HTN, HLD, and lumbar radiculopathy referred by PCP for CV evaluation.     Patient underwent screening for heart disease with CAC score of 16; mild prominence of the ascending thoracic aorta measuring 3.8 cm; borderline enlarged heart; trace pericardial effusion. Patient then had an echo ordered normal LVEF; grade II diastolic dysfunction; mild aortic stenosis.  -ASCVD 10-year risk of 6%    Patient denies any chest pain/pressure/discomfort, shortness of breath, palpitations, syncope, or lower leg swelling.           Cardiovascular testing:  Echo (June, 2025)-LVEF is normal at 60-65%; grade II diastolic dysfunction; mild aortic stenosis         Past Medical, Surgical, and Family History reviewed and updated in chart.     Reviewed all medications by prescribing practitioner or clinical pharmacist (such as prescriptions, OTCs, herbal therapies and supplements) and documented in the medical record.    Past Medical History  Medical History[1]    Social History  Social History[2]    Family History   Family History[3]     Allergies:  RX Allergies[4]     Outpatient Medications:  Current Outpatient Medications   Medication Instructions    cholecalciferol (Vitamin D3) 5,000 Units tablet Daily    lisinopril 10 mg, oral, Daily    rosuvastatin (CRESTOR) 20 mg, oral, Daily          Labs:   CMP:  Recent Labs     04/04/25 1847 01/08/25 1120 02/11/23  0819    140 140   K 4.1 4.0 4.1    104 105   CO2 26 29 27   ANIONGAP 14 11 12   BUN 15 9 14   CREATININE 0.70 0.65 0.71   EGFR >90 >90  --    MG 2.24  --   --      Recent Labs     04/04/25 1847 01/08/25  1120 02/11/23  0819   ALBUMIN 4.7 4.6 4.2   ALKPHOS 78 70 63   ALT 16 17 15   AST 22 23 22   BILITOT 0.9 1.3* 1.1     CBC:  Recent Labs     04/04/25 1847 01/08/25 1120 02/11/23  0819   WBC 5.8 5.6 3.7*   HGB 14.5  "14.9 14.5   HCT 43.7 45.4 44.1    273 277   MCV 92 94 93     COAG:   Recent Labs     04/04/25  1847   INR 1.0     ABO: No results for input(s): \"ABO\" in the last 18119 hours.  HEME/ENDO:No results for input(s): \"FERRITIN\", \"IRONSAT\", \"TSH\", \"HGBA1C\" in the last 16956 hours.   CARDIAC: No results for input(s): \"LDH\", \"CKMB\", \"TROPHS\", \"BNP\" in the last 64383 hours.    No lab exists for component: \"CK\", \"CKMBP\"  Recent Labs     01/08/25  1120 02/11/23  0819   CHOL 244* 215*   LDLF  --  86   .0 114.0   TRIG 95 77     MICRO: No results for input(s): \"ESR\", \"CRP\", \"PROCAL\" in the last 42586 hours.  No results found for the last 90 days.    Notable Studies: imaging personally reviewed   EKG:  Encounter Date: 04/04/25   ECG 12 lead   Result Value    Ventricular Rate 70    Atrial Rate 70    VT Interval 174    QRS Duration 90    QT Interval 390    QTC Calculation(Bazett) 421    P Axis 82    R Axis 62    T Axis 84    QRS Count 12    Q Onset 216    P Onset 129    P Offset 191    T Offset 411    QTC Fredericia 410    Narrative    Normal sinus rhythm with sinus arrhythmia  Septal infarct (cited on or before 21-SEP-2024)  Abnormal ECG  When compared with ECG of 21-SEP-2024 12:42,  Nonspecific T wave abnormality no longer evident in Inferior leads  See ED provider note for full interpretation and clinical correlation  Confirmed by Veena Luis ([id number]) on 4/8/2025 10:29:19 AM     Echocardiogram: No results found for this or any previous visit from the past 1825 days.    Stress Testing: No results found for this or any previous visit from the past 1825 days.    Cardiac Catheterization: No results found for this or any previous visit from the past 1825 days.  No results found for this or any previous visit from the past 3650 days.         REVIEW OF SYSTEMS  A 10-point system review was completed and was negative except as noted in the HPI.      VITALS  Vitals:    07/30/25 1337   BP: 136/78   Pulse: 56   SpO2: 98% "       PHYSICAL EXAM  General: awake, alert and oriented. No acute distress.   Skin: Skin is warm, dry and intact without rashes or lesions.  HEENT: normocephalic, atraumatic; conjunctivae are clear without exudates or hemorrhage. Sclera is non-icteric. Eyelids are normal in appearance without swelling or lesions. Hearing intact. Nares are patent bilaterally. Moist mucous membranes.   Cardiovascular: heart rate and rhythm are normal. Soft murmur  Respiratory: bilateral lung sounds clear to auscultations without rales, rhonchi, or wheezes. No accessory muscle use or stridor  Musculoskeletal: ROM intact, no deformities  Extremities: no swelling   Neurological: no focal deficits; gait steady  Psychiatric: appropriate mood and affect; good judgment and insight          ASSESSMENT AND PLAN  Assessment/Plan   Diagnoses and all orders for this visit:  Thoracic aortic ectasia  -Stable; BP normotensive  -Will repeat CTA of the chest in 1 year and if no change, no further imaging required    Mild aortic stenosis  -Asymptomatic; will repeat echo in 1 year    Mixed hyperlipidemia  -Moderate risk; will start ASA 81mg and rosuvastatin 20mg daily for LDL goal <70          RTC: 1 year      Thank you for allowing me to participate in the care of this patient. Please reach me out if you have any questions or if you need any clarifications regarding the patient's care.    Parisa Colindres, EDEN, APRN, FNP-C  Division of Cardiovascular Medicine  [facility]  [facility]         [1]   Past Medical History:  Diagnosis Date    Breast cyst 2008    Bunion 2010    Closed fracture of left ankle, with routine healing, subsequent encounter 03/07/2024    Closed left ankle fracture, initial encounter 12/01/2023    Fracture of ankle 2023    Hypertension     Left malleolar fracture, closed, with routine healing, subsequent encounter 03/07/2024    Radius and ulna distal fracture 1985    Right wrist fracture,  closed, initial encounter 09/24/2024    Stress fracture 2022, 2023    Varicella    [2]   Social History  Tobacco Use    Smoking status: Never    Smokeless tobacco: Never   Vaping Use    Vaping status: Never Used   Substance Use Topics    Alcohol use: Yes     Alcohol/week: 7.0 standard drinks of alcohol     Types: 7 Standard drinks or equivalent per week     Comment: occasionally    Drug use: Never   [3]   Family History  Problem Relation Name Age of Onset    Cancer Mother Casandra     Ovarian cancer Mother Casandra     Cancer Father Adan     Breast cancer Maternal Grandmother Sparkle    [4] No Known Allergies

## 2025-07-30 ENCOUNTER — APPOINTMENT (OUTPATIENT)
Age: 65
End: 2025-07-30
Payer: COMMERCIAL

## 2025-07-30 ENCOUNTER — OFFICE VISIT (OUTPATIENT)
Dept: CARDIOLOGY | Facility: CLINIC | Age: 65
End: 2025-07-30
Payer: COMMERCIAL

## 2025-07-30 VITALS
DIASTOLIC BLOOD PRESSURE: 78 MMHG | HEIGHT: 64 IN | BODY MASS INDEX: 25.61 KG/M2 | HEART RATE: 56 BPM | WEIGHT: 150 LBS | SYSTOLIC BLOOD PRESSURE: 136 MMHG | OXYGEN SATURATION: 98 %

## 2025-07-30 VITALS
HEIGHT: 64 IN | SYSTOLIC BLOOD PRESSURE: 120 MMHG | HEART RATE: 72 BPM | BODY MASS INDEX: 25.53 KG/M2 | WEIGHT: 149.56 LBS | DIASTOLIC BLOOD PRESSURE: 84 MMHG

## 2025-07-30 DIAGNOSIS — E78.2 MIXED HYPERLIPIDEMIA: ICD-10-CM

## 2025-07-30 DIAGNOSIS — I10 PRIMARY HYPERTENSION: ICD-10-CM

## 2025-07-30 DIAGNOSIS — I77.810 THORACIC AORTIC ECTASIA: Primary | ICD-10-CM

## 2025-07-30 DIAGNOSIS — I35.0 MILD AORTIC STENOSIS: ICD-10-CM

## 2025-07-30 PROCEDURE — 3074F SYST BP LT 130 MM HG: CPT | Performed by: NURSE PRACTITIONER

## 2025-07-30 PROCEDURE — 99213 OFFICE O/P EST LOW 20 MIN: CPT | Performed by: NURSE PRACTITIONER

## 2025-07-30 PROCEDURE — 99205 OFFICE O/P NEW HI 60 MIN: CPT

## 2025-07-30 PROCEDURE — 3078F DIAST BP <80 MM HG: CPT

## 2025-07-30 PROCEDURE — 3079F DIAST BP 80-89 MM HG: CPT | Performed by: NURSE PRACTITIONER

## 2025-07-30 PROCEDURE — 3008F BODY MASS INDEX DOCD: CPT | Performed by: NURSE PRACTITIONER

## 2025-07-30 PROCEDURE — 1036F TOBACCO NON-USER: CPT

## 2025-07-30 PROCEDURE — 1036F TOBACCO NON-USER: CPT | Performed by: NURSE PRACTITIONER

## 2025-07-30 PROCEDURE — 3075F SYST BP GE 130 - 139MM HG: CPT

## 2025-07-30 PROCEDURE — 3008F BODY MASS INDEX DOCD: CPT

## 2025-07-30 RX ORDER — ROSUVASTATIN CALCIUM 20 MG/1
20 TABLET, COATED ORAL DAILY
Qty: 30 TABLET | Refills: 11 | Status: SHIPPED | OUTPATIENT
Start: 2025-07-30 | End: 2026-07-30

## 2025-07-30 RX ORDER — LISINOPRIL 10 MG/1
10 TABLET ORAL DAILY
Qty: 90 TABLET | Refills: 1 | Status: SHIPPED | OUTPATIENT
Start: 2025-07-30

## 2025-07-30 ASSESSMENT — ENCOUNTER SYMPTOMS
PALPITATIONS: 0
CHEST TIGHTNESS: 0
FATIGUE: 0

## 2025-07-30 NOTE — LETTER
July 30, 2025     Erica Napier, APRN-CNP  [address]  [address]  [place] [address]    Patient: Natalie Fung   YOB: 1960   Date of Visit: 7/30/2025       Dear Dr. Erica Napier, APRN-CNP:    Thank you for referring Natalie Fung to me for evaluation. Below are my notes for this consultation.  If you have questions, please do not hesitate to call me. I look forward to following your patient along with you.       Sincerely,     Parisa Colindres, AILYN-CNP, [facility]      CC: No Recipients  ______________________________________________________________________________________          CHIEF COMPLAINT  Referred by PCP, Erica Napier, for CV evaluation    HISTORY OF PRESENT ILLNESS    Natalie Fung is a 65-year-old female with a history of HTN, HLD, and lumbar radiculopathy referred by PCP for CV evaluation.     Patient underwent screening for heart disease with CAC score of 16; mild prominence of the ascending thoracic aorta measuring 3.8 cm; borderline enlarged heart; trace pericardial effusion. Patient then had an echo ordered normal LVEF; grade II diastolic dysfunction; mild aortic stenosis.  -ASCVD 10-year risk of 6%    Patient denies any chest pain/pressure/discomfort, shortness of breath, palpitations, syncope, or lower leg swelling.           Cardiovascular testing:  Echo (June, 2025)-LVEF is normal at 60-65%; grade II diastolic dysfunction; mild aortic stenosis         Past Medical, Surgical, and Family History reviewed and updated in chart.     Reviewed all medications by prescribing practitioner or clinical pharmacist (such as prescriptions, OTCs, herbal therapies and supplements) and documented in the medical record.    Past Medical History  Medical History[1]    Social History  Social History[2]    Family History   Family History[3]     Allergies:  RX Allergies[4]     Outpatient Medications:  Current Outpatient Medications   Medication Instructions   • cholecalciferol (Vitamin D3) 5,000 Units  "tablet Daily   • lisinopril 10 mg, oral, Daily   • rosuvastatin (CRESTOR) 20 mg, oral, Daily          Labs:   CMP:  Recent Labs     04/04/25 1847 01/08/25 1120 02/11/23  0819    140 140   K 4.1 4.0 4.1    104 105   CO2 26 29 27   ANIONGAP 14 11 12   BUN 15 9 14   CREATININE 0.70 0.65 0.71   EGFR >90 >90  --    MG 2.24  --   --      Recent Labs     04/04/25 1847 01/08/25 1120 02/11/23  0819   ALBUMIN 4.7 4.6 4.2   ALKPHOS 78 70 63   ALT 16 17 15   AST 22 23 22   BILITOT 0.9 1.3* 1.1     CBC:  Recent Labs     04/04/25 1847 01/08/25 1120 02/11/23  0819   WBC 5.8 5.6 3.7*   HGB 14.5 14.9 14.5   HCT 43.7 45.4 44.1    273 277   MCV 92 94 93     COAG:   Recent Labs     04/04/25 1847   INR 1.0     ABO: No results for input(s): \"ABO\" in the last 92437 hours.  HEME/ENDO:No results for input(s): \"FERRITIN\", \"IRONSAT\", \"TSH\", \"HGBA1C\" in the last 34889 hours.   CARDIAC: No results for input(s): \"LDH\", \"CKMB\", \"TROPHS\", \"BNP\" in the last 18933 hours.    No lab exists for component: \"CK\", \"CKMBP\"  Recent Labs     01/08/25 1120 02/11/23  0819   CHOL 244* 215*   LDLF  --  86   .0 114.0   TRIG 95 77     MICRO: No results for input(s): \"ESR\", \"CRP\", \"PROCAL\" in the last 06076 hours.  No results found for the last 90 days.    Notable Studies: imaging personally reviewed   EKG:  Encounter Date: 04/04/25   ECG 12 lead   Result Value    Ventricular Rate 70    Atrial Rate 70    [place] Interval 174    QRS Duration 90    QT Interval 390    QTC Calculation(Bazett) 421    P Axis 82    R Axis 62    T Axis 84    QRS Count 12    Q Onset 216    P Onset 129    P Offset 191    T Offset 411    QTC Fredericia 410    Narrative    Normal sinus rhythm with sinus arrhythmia  Septal infarct (cited on or before 21-SEP-2024)  Abnormal ECG  When compared with ECG of 21-SEP-2024 12:42,  Nonspecific T wave abnormality no longer evident in Inferior leads  See ED provider note for full interpretation and clinical " correlation  Confirmed by Veena Luis ([id number]) on 4/8/2025 10:29:19 AM     Echocardiogram: No results found for this or any previous visit from the past 1825 days.    Stress Testing: No results found for this or any previous visit from the past 1825 days.    Cardiac Catheterization: No results found for this or any previous visit from the past 1825 days.  No results found for this or any previous visit from the past 3650 days.         REVIEW OF SYSTEMS  A 10-point system review was completed and was negative except as noted in the HPI.      VITALS  Vitals:    07/30/25 1337   BP: 136/78   Pulse: 56   SpO2: 98%       PHYSICAL EXAM  General: awake, alert and oriented. No acute distress.   Skin: Skin is warm, dry and intact without rashes or lesions.  HEENT: normocephalic, atraumatic; conjunctivae are clear without exudates or hemorrhage. Sclera is non-icteric. Eyelids are normal in appearance without swelling or lesions. Hearing intact. Nares are patent bilaterally. Moist mucous membranes.   Cardiovascular: heart rate and rhythm are normal. Soft murmur  Respiratory: bilateral lung sounds clear to auscultations without rales, rhonchi, or wheezes. No accessory muscle use or stridor  Musculoskeletal: ROM intact, no deformities  Extremities: no swelling   Neurological: no focal deficits; gait steady  Psychiatric: appropriate mood and affect; good judgment and insight          ASSESSMENT AND PLAN  Assessment/Plan  Diagnoses and all orders for this visit:  Thoracic aortic ectasia  -Stable; BP normotensive  -Will repeat CTA of the chest in 1 year and if no change, no further imaging required    Mild aortic stenosis  -Asymptomatic; will repeat echo in 1 year    Mixed hyperlipidemia  -Moderate risk; will start ASA 81mg and rosuvastatin 20mg daily for LDL goal <70          RTC: 1 year      Thank you for allowing me to participate in the care of this patient. Please reach me out if you have any questions or if you need any  clarifications regarding the patient's care.    Parisa Colindres, DNP, APRN, FNP-C  Division of Cardiovascular Medicine  [facility]  [facility]         [1]  Past Medical History:  Diagnosis Date   • Breast cyst 2008   • Bunion 2010   • Closed fracture of left ankle, with routine healing, subsequent encounter 03/07/2024   • Closed left ankle fracture, initial encounter 12/01/2023   • Fracture of ankle 2023   • Hypertension    • Left malleolar fracture, closed, with routine healing, subsequent encounter 03/07/2024   • Radius and ulna distal fracture 1985   • Right wrist fracture, closed, initial encounter 09/24/2024   • Stress fracture 2022, 2023   • Varicella    [2]  Social History  Tobacco Use   • Smoking status: Never   • Smokeless tobacco: Never   Vaping Use   • Vaping status: Never Used   Substance Use Topics   • Alcohol use: Yes     Alcohol/week: 7.0 standard drinks of alcohol     Types: 7 Standard drinks or equivalent per week     Comment: occasionally   • Drug use: Never   [3]  Family History  Problem Relation Name Age of Onset   • Cancer Mother Casandra    • Ovarian cancer Mother Casandra    • Cancer Father Adan    • Breast cancer Maternal Grandmother Sparkle    [4]  No Known Allergies

## 2025-07-30 NOTE — PATIENT INSTRUCTIONS
Start aspirin 81mg daily  Start rosuvastatin 20mg daily  Will order a repeat CT of your chest in 1 year  Will order a repeat ultrasound of your heart in 1 year  Follow up after testing

## 2025-07-30 NOTE — PROGRESS NOTES
"Subjective   Patient ID: Natalie Fung is a 65 y.o. female who presents for Follow-up.    HPI   Natalie returns for HTN follow up.    HTN: doing well on lisinopril. No side effects to medication. Denies chest pain/tightness, palpitations, dizziness, or lower leg edema.     Review of Systems   Constitutional:  Negative for fatigue.   Respiratory:  Negative for chest tightness.    Cardiovascular:  Negative for chest pain, palpitations and leg swelling.       Objective   /84   Pulse 72   Ht 1.626 m (5' 4\")   Wt 67.8 kg (149 lb 9 oz)   LMP  (LMP Unknown)   BMI 25.67 kg/m²     Physical Exam  Vitals and nursing note reviewed.   Constitutional:       Appearance: Normal appearance.   HENT:      Head: Normocephalic and atraumatic.     Cardiovascular:      Rate and Rhythm: Normal rate and regular rhythm.      Pulses: Normal pulses.   Pulmonary:      Effort: Pulmonary effort is normal.     Neurological:      General: No focal deficit present.      Mental Status: She is alert and oriented to person, place, and time.     Psychiatric:         Mood and Affect: Mood normal.         Behavior: Behavior normal.         Thought Content: Thought content normal.         Judgment: Judgment normal.         Assessment/Plan   Problem List Items Addressed This Visit           ICD-10-CM    Primary hypertension I10    Continue lisinopril 10 mg daily - refilled         Relevant Medications    lisinopril 10 mg tablet        Follow up in April for wellness exam. Return precautions discussed. Has appt with cardiology later today for abnormal echocardiogram.    For any new medications that were prescribed today, the patient was educated about their indications for use, administration, frequency and potential side effects of the medication.  "

## 2026-04-28 ENCOUNTER — APPOINTMENT (OUTPATIENT)
Age: 66
End: 2026-04-28
Payer: COMMERCIAL

## 2026-07-09 ENCOUNTER — APPOINTMENT (OUTPATIENT)
Dept: CARDIOLOGY | Facility: CLINIC | Age: 66
End: 2026-07-09
Payer: COMMERCIAL